# Patient Record
Sex: FEMALE | Race: WHITE | NOT HISPANIC OR LATINO | URBAN - METROPOLITAN AREA
[De-identification: names, ages, dates, MRNs, and addresses within clinical notes are randomized per-mention and may not be internally consistent; named-entity substitution may affect disease eponyms.]

---

## 2023-01-26 ENCOUNTER — INPATIENT (INPATIENT)
Facility: HOSPITAL | Age: 58
LOS: 3 days | Discharge: HOME | DRG: 872 | End: 2023-01-30
Attending: INTERNAL MEDICINE | Admitting: INTERNAL MEDICINE
Payer: COMMERCIAL

## 2023-01-26 VITALS
DIASTOLIC BLOOD PRESSURE: 69 MMHG | SYSTOLIC BLOOD PRESSURE: 131 MMHG | TEMPERATURE: 101 F | WEIGHT: 119.93 LBS | OXYGEN SATURATION: 100 % | HEART RATE: 124 BPM | HEIGHT: 61 IN | RESPIRATION RATE: 16 BRPM

## 2023-01-26 DIAGNOSIS — E78.5 HYPERLIPIDEMIA, UNSPECIFIED: ICD-10-CM

## 2023-01-26 DIAGNOSIS — N17.9 ACUTE KIDNEY FAILURE, UNSPECIFIED: ICD-10-CM

## 2023-01-26 DIAGNOSIS — N12 TUBULO-INTERSTITIAL NEPHRITIS, NOT SPECIFIED AS ACUTE OR CHRONIC: ICD-10-CM

## 2023-01-26 DIAGNOSIS — I10 ESSENTIAL (PRIMARY) HYPERTENSION: ICD-10-CM

## 2023-01-26 LAB
ALBUMIN SERPL ELPH-MCNC: 3.1 G/DL — LOW (ref 3.3–5)
ALP SERPL-CCNC: 131 U/L — HIGH (ref 30–120)
ALT FLD-CCNC: 29 U/L DA — SIGNIFICANT CHANGE UP (ref 10–60)
ANION GAP SERPL CALC-SCNC: 9 MMOL/L — SIGNIFICANT CHANGE UP (ref 5–17)
APPEARANCE UR: CLEAR — SIGNIFICANT CHANGE UP
APTT BLD: 26.4 SEC — LOW (ref 27.5–35.5)
AST SERPL-CCNC: 28 U/L — SIGNIFICANT CHANGE UP (ref 10–40)
BACTERIA # UR AUTO: ABNORMAL
BASOPHILS # BLD AUTO: 0.02 K/UL — SIGNIFICANT CHANGE UP (ref 0–0.2)
BASOPHILS NFR BLD AUTO: 0.2 % — SIGNIFICANT CHANGE UP (ref 0–2)
BILIRUB SERPL-MCNC: 0.6 MG/DL — SIGNIFICANT CHANGE UP (ref 0.2–1.2)
BILIRUB UR-MCNC: NEGATIVE — SIGNIFICANT CHANGE UP
BUN SERPL-MCNC: 42 MG/DL — HIGH (ref 7–23)
CALCIUM SERPL-MCNC: 10.2 MG/DL — SIGNIFICANT CHANGE UP (ref 8.4–10.5)
CHLORIDE SERPL-SCNC: 99 MMOL/L — SIGNIFICANT CHANGE UP (ref 96–108)
CO2 SERPL-SCNC: 26 MMOL/L — SIGNIFICANT CHANGE UP (ref 22–31)
COLOR SPEC: YELLOW — SIGNIFICANT CHANGE UP
CREAT SERPL-MCNC: 2.7 MG/DL — HIGH (ref 0.5–1.3)
DIFF PNL FLD: ABNORMAL
EGFR: 20 ML/MIN/1.73M2 — LOW
EOSINOPHIL # BLD AUTO: 0 K/UL — SIGNIFICANT CHANGE UP (ref 0–0.5)
EOSINOPHIL NFR BLD AUTO: 0 % — SIGNIFICANT CHANGE UP (ref 0–6)
EPI CELLS # UR: SIGNIFICANT CHANGE UP
FLUAV AG NPH QL: SIGNIFICANT CHANGE UP
FLUBV AG NPH QL: SIGNIFICANT CHANGE UP
GLUCOSE SERPL-MCNC: 113 MG/DL — HIGH (ref 70–99)
GLUCOSE UR QL: NEGATIVE MG/DL — SIGNIFICANT CHANGE UP
HCT VFR BLD CALC: 33.5 % — LOW (ref 34.5–45)
HGB BLD-MCNC: 10.8 G/DL — LOW (ref 11.5–15.5)
IMM GRANULOCYTES NFR BLD AUTO: 0.5 % — SIGNIFICANT CHANGE UP (ref 0–0.9)
INR BLD: 1.08 RATIO — SIGNIFICANT CHANGE UP (ref 0.88–1.16)
KETONES UR-MCNC: ABNORMAL
LACTATE SERPL-SCNC: 0.6 MMOL/L — LOW (ref 0.7–2)
LEUKOCYTE ESTERASE UR-ACNC: ABNORMAL
LIDOCAIN IGE QN: 75 U/L — SIGNIFICANT CHANGE UP (ref 73–393)
LYMPHOCYTES # BLD AUTO: 0.4 K/UL — LOW (ref 1–3.3)
LYMPHOCYTES # BLD AUTO: 4.2 % — LOW (ref 13–44)
MCHC RBC-ENTMCNC: 27.3 PG — SIGNIFICANT CHANGE UP (ref 27–34)
MCHC RBC-ENTMCNC: 32.2 GM/DL — SIGNIFICANT CHANGE UP (ref 32–36)
MCV RBC AUTO: 84.8 FL — SIGNIFICANT CHANGE UP (ref 80–100)
MONOCYTES # BLD AUTO: 0.84 K/UL — SIGNIFICANT CHANGE UP (ref 0–0.9)
MONOCYTES NFR BLD AUTO: 8.9 % — SIGNIFICANT CHANGE UP (ref 2–14)
NEUTROPHILS # BLD AUTO: 8.17 K/UL — HIGH (ref 1.8–7.4)
NEUTROPHILS NFR BLD AUTO: 86.2 % — HIGH (ref 43–77)
NITRITE UR-MCNC: NEGATIVE — SIGNIFICANT CHANGE UP
NRBC # BLD: 0 /100 WBCS — SIGNIFICANT CHANGE UP (ref 0–0)
PH UR: 6 — SIGNIFICANT CHANGE UP (ref 5–8)
PLATELET # BLD AUTO: 189 K/UL — SIGNIFICANT CHANGE UP (ref 150–400)
POTASSIUM SERPL-MCNC: 4.1 MMOL/L — SIGNIFICANT CHANGE UP (ref 3.5–5.3)
POTASSIUM SERPL-SCNC: 4.1 MMOL/L — SIGNIFICANT CHANGE UP (ref 3.5–5.3)
PROT SERPL-MCNC: 7.4 G/DL — SIGNIFICANT CHANGE UP (ref 6–8.3)
PROT UR-MCNC: 100 MG/DL
PROTHROM AB SERPL-ACNC: 12.8 SEC — SIGNIFICANT CHANGE UP (ref 10.5–13.4)
RBC # BLD: 3.95 M/UL — SIGNIFICANT CHANGE UP (ref 3.8–5.2)
RBC # FLD: 14.4 % — SIGNIFICANT CHANGE UP (ref 10.3–14.5)
RBC CASTS # UR COMP ASSIST: ABNORMAL /HPF (ref 0–4)
RSV RNA NPH QL NAA+NON-PROBE: SIGNIFICANT CHANGE UP
SARS-COV-2 RNA SPEC QL NAA+PROBE: SIGNIFICANT CHANGE UP
SODIUM SERPL-SCNC: 134 MMOL/L — LOW (ref 135–145)
SP GR SPEC: 1.01 — SIGNIFICANT CHANGE UP (ref 1.01–1.02)
UROBILINOGEN FLD QL: NEGATIVE MG/DL — SIGNIFICANT CHANGE UP
WBC # BLD: 9.48 K/UL — SIGNIFICANT CHANGE UP (ref 3.8–10.5)
WBC # FLD AUTO: 9.48 K/UL — SIGNIFICANT CHANGE UP (ref 3.8–10.5)
WBC UR QL: ABNORMAL

## 2023-01-26 PROCEDURE — 74176 CT ABD & PELVIS W/O CONTRAST: CPT | Mod: 26,MA

## 2023-01-26 PROCEDURE — 71045 X-RAY EXAM CHEST 1 VIEW: CPT | Mod: 26

## 2023-01-26 PROCEDURE — 99285 EMERGENCY DEPT VISIT HI MDM: CPT

## 2023-01-26 PROCEDURE — 93010 ELECTROCARDIOGRAM REPORT: CPT

## 2023-01-26 RX ORDER — ACETAMINOPHEN 500 MG
1000 TABLET ORAL ONCE
Refills: 0 | Status: COMPLETED | OUTPATIENT
Start: 2023-01-26 | End: 2023-01-26

## 2023-01-26 RX ORDER — ATORVASTATIN CALCIUM 80 MG/1
1 TABLET, FILM COATED ORAL
Qty: 0 | Refills: 0 | DISCHARGE

## 2023-01-26 RX ORDER — ONDANSETRON 8 MG/1
4 TABLET, FILM COATED ORAL ONCE
Refills: 0 | Status: COMPLETED | OUTPATIENT
Start: 2023-01-26 | End: 2023-01-26

## 2023-01-26 RX ORDER — SODIUM CHLORIDE 9 MG/ML
1500 INJECTION INTRAMUSCULAR; INTRAVENOUS; SUBCUTANEOUS ONCE
Refills: 0 | Status: COMPLETED | OUTPATIENT
Start: 2023-01-26 | End: 2023-01-26

## 2023-01-26 RX ORDER — AMLODIPINE BESYLATE 2.5 MG/1
1 TABLET ORAL
Qty: 0 | Refills: 0 | DISCHARGE

## 2023-01-26 RX ORDER — AMLODIPINE BESYLATE 2.5 MG/1
5 TABLET ORAL DAILY
Refills: 0 | Status: DISCONTINUED | OUTPATIENT
Start: 2023-01-26 | End: 2023-01-27

## 2023-01-26 RX ORDER — FAMOTIDINE 10 MG/ML
20 INJECTION INTRAVENOUS ONCE
Refills: 0 | Status: COMPLETED | OUTPATIENT
Start: 2023-01-26 | End: 2023-01-26

## 2023-01-26 RX ORDER — ENOXAPARIN SODIUM 100 MG/ML
30 INJECTION SUBCUTANEOUS EVERY 24 HOURS
Refills: 0 | Status: DISCONTINUED | OUTPATIENT
Start: 2023-01-26 | End: 2023-01-30

## 2023-01-26 RX ORDER — CEFTRIAXONE 500 MG/1
1000 INJECTION, POWDER, FOR SOLUTION INTRAMUSCULAR; INTRAVENOUS ONCE
Refills: 0 | Status: COMPLETED | OUTPATIENT
Start: 2023-01-26 | End: 2023-01-26

## 2023-01-26 RX ORDER — ONDANSETRON 8 MG/1
4 TABLET, FILM COATED ORAL ONCE
Refills: 0 | Status: DISCONTINUED | OUTPATIENT
Start: 2023-01-26 | End: 2023-01-30

## 2023-01-26 RX ORDER — ACETAMINOPHEN 500 MG
650 TABLET ORAL EVERY 6 HOURS
Refills: 0 | Status: DISCONTINUED | OUTPATIENT
Start: 2023-01-26 | End: 2023-01-30

## 2023-01-26 RX ORDER — CEFTRIAXONE 500 MG/1
1000 INJECTION, POWDER, FOR SOLUTION INTRAMUSCULAR; INTRAVENOUS EVERY 24 HOURS
Refills: 0 | Status: COMPLETED | OUTPATIENT
Start: 2023-01-26 | End: 2023-01-27

## 2023-01-26 RX ORDER — SODIUM CHLORIDE 9 MG/ML
1000 INJECTION INTRAMUSCULAR; INTRAVENOUS; SUBCUTANEOUS
Refills: 0 | Status: DISCONTINUED | OUTPATIENT
Start: 2023-01-26 | End: 2023-01-28

## 2023-01-26 RX ORDER — ATORVASTATIN CALCIUM 80 MG/1
10 TABLET, FILM COATED ORAL AT BEDTIME
Refills: 0 | Status: DISCONTINUED | OUTPATIENT
Start: 2023-01-26 | End: 2023-01-30

## 2023-01-26 RX ORDER — ALBUTEROL 90 UG/1
1 AEROSOL, METERED ORAL EVERY 6 HOURS
Refills: 0 | Status: DISCONTINUED | OUTPATIENT
Start: 2023-01-26 | End: 2023-01-30

## 2023-01-26 RX ORDER — LACTOBACILLUS ACIDOPHILUS 100MM CELL
1 CAPSULE ORAL
Refills: 0 | Status: DISCONTINUED | OUTPATIENT
Start: 2023-01-26 | End: 2023-01-30

## 2023-01-26 RX ORDER — ALBUTEROL 90 UG/1
0 AEROSOL, METERED ORAL
Qty: 0 | Refills: 0 | DISCHARGE

## 2023-01-26 RX ADMIN — Medication 400 MILLIGRAM(S): at 18:27

## 2023-01-26 RX ADMIN — ENOXAPARIN SODIUM 30 MILLIGRAM(S): 100 INJECTION SUBCUTANEOUS at 22:29

## 2023-01-26 RX ADMIN — ONDANSETRON 4 MILLIGRAM(S): 8 TABLET, FILM COATED ORAL at 18:19

## 2023-01-26 RX ADMIN — CEFTRIAXONE 100 MILLIGRAM(S): 500 INJECTION, POWDER, FOR SOLUTION INTRAMUSCULAR; INTRAVENOUS at 19:52

## 2023-01-26 RX ADMIN — SODIUM CHLORIDE 100 MILLILITER(S): 9 INJECTION INTRAMUSCULAR; INTRAVENOUS; SUBCUTANEOUS at 22:29

## 2023-01-26 RX ADMIN — Medication 1000 MILLIGRAM(S): at 19:19

## 2023-01-26 RX ADMIN — SODIUM CHLORIDE 1500 MILLILITER(S): 9 INJECTION INTRAMUSCULAR; INTRAVENOUS; SUBCUTANEOUS at 18:21

## 2023-01-26 RX ADMIN — ATORVASTATIN CALCIUM 10 MILLIGRAM(S): 80 TABLET, FILM COATED ORAL at 22:29

## 2023-01-26 RX ADMIN — CEFTRIAXONE 1000 MILLIGRAM(S): 500 INJECTION, POWDER, FOR SOLUTION INTRAMUSCULAR; INTRAVENOUS at 20:22

## 2023-01-26 RX ADMIN — SODIUM CHLORIDE 1500 MILLILITER(S): 9 INJECTION INTRAMUSCULAR; INTRAVENOUS; SUBCUTANEOUS at 19:55

## 2023-01-26 RX ADMIN — FAMOTIDINE 20 MILLIGRAM(S): 10 INJECTION INTRAVENOUS at 18:21

## 2023-01-26 RX ADMIN — AMLODIPINE BESYLATE 5 MILLIGRAM(S): 2.5 TABLET ORAL at 22:30

## 2023-01-26 NOTE — ED PROVIDER NOTE - NS ED ATTENDING STATEMENT MOD
This was a shared visit with the AMAIRANI. I reviewed and verified the documentation and independently performed the documented:

## 2023-01-26 NOTE — ED ADULT NURSE NOTE - OBJECTIVE STATEMENT
I was diagnosed with UTI Saturday at Urgent Care, started Bactrim that day, but I started to have nausea, vomiting, headache, abdominal pain Monday, I went there again and they changed my medicine to Nitrofuradantoin  but I am still vomiting, I still have a headache, nausea

## 2023-01-26 NOTE — H&P ADULT - ASSESSMENT
57-year-old female past medical history of hypertension hyperlipidemia asthma C-sections x2 coming in for fever and UTI symptoms for over 1 week.  Patient states she went to urgent care diagnosed with UTI started on Bactrim developed abdominal pain vomiting so went back yesterday thought to be antibiotic reaction started on Macrobid but continues to have vomiting.  Patient also having low-grade temperatures flu COVID-negative urinary symptoms have improved.  Patient denies any chest pain shortness of breath cough.   In ED febrile, tachycardic, neutrophilia, elvated BUN creatinine, and CT - rt renal edema pyelonephritis.started on ceftriaxone and admitted for  sepsis ( fever tachycardia, neutrophilia,raj)  due to ac pyelonephritis  ess HTN  HLD  RAJ   57-year-old female past medical history of hypertension hyperlipidemia asthma C-sections x2 coming in for fever and UTI symptoms for over 1 week.  Patient states she went to urgent care diagnosed with UTI started on Bactrim developed abdominal pain vomiting so went back yesterday thought to be antibiotic reaction started on Macrobid but continues to have vomiting.  Patient also having low-grade temperatures flu COVID-negative urinary symptoms have improved.  Patient denies any chest pain shortness of breath cough.   In ED febrile, tachycardic, neutrophilia, elvated BUN creatinine, and CT - rt renal edema pyelonephritis.started on ceftriaxone and admitted for  sepsis ( fever tachycardia, neutrophilia,raj)  due to ac pyelonephritis  ess HTN  HLD  RAJ   started on ceftriaxone, ID consult and nephro consult called

## 2023-01-26 NOTE — H&P ADULT - HISTORY OF PRESENT ILLNESS
57-year-old female past medical history of hypertension hyperlipidemia asthma C-sections x2 coming in for fever and UTI symptoms for over 1 week.  Patient states she went to urgent care diagnosed with UTI started on Bactrim developed abdominal pain vomiting so went back yesterday thought to be antibiotic reaction started on Macrobid but continues to have vomiting.  Patient also having low-grade temperatures flu COVID-negative urinary symptoms have improved.  Patient denies any chest pain shortness of breath cough.   In ED febrile, tachycardic, neutrophilia, elvated BUN creatinine, and CT - rt renal edema pyelonephritis.started on ceftriaxone and admitted

## 2023-01-26 NOTE — ED ADULT NURSE NOTE - DOES PATIENT HAVE ADVANCE DIRECTIVE
----- Message from Stewrat Richards sent at 6/21/2018  2:12 PM CDT -----  Contact: MILDRED BANGURA [[id number]]            Name of Who is Calling: MILDRED BANGURA [[id number]]  What is the request in detail: Patient would like to speak with staff in regards to follow up on next step; especially while pregnant.      Can the clinic reply by [facility]: yes      What Number to Call Back if not in [facility]: [phone]                                  
Patients wants to know what is the next steps in migraine prevention since she is pregnant. Will forward message to provider for further instructions.  
No

## 2023-01-26 NOTE — H&P ADULT - TIME BILLING
I have discussed care plan with patient and HCP,expressed understanding of problems treatment and their effect and side effects, alternatives in detail,I have asked if they have any questions and concerns and appropriately addressed them to best of my ability  Reviewed all diagonostic tests, lab results and drug drug interactions, and medications  d/w ID , nephro consultants

## 2023-01-26 NOTE — ED ADULT TRIAGE NOTE - CHIEF COMPLAINT QUOTE
" I was diagnosed with UTI Saturday at Urgent Care, started Bactrim that day, but I started to have nausea, vomiting, headache, abdominal pain Monday, I went there again and they changed my medicine to Nitrofuradantoin  but I am still vomiting, I still have a headache, nausea

## 2023-01-26 NOTE — ED PROVIDER NOTE - DIFFERENTIAL DIAGNOSIS
Differential including but not limited to UTI kidney stone pancreatitis diverticulitis colitis or other intra-abdominal infection pneumonia flu COVID electrolyte abnormality Differential Diagnosis

## 2023-01-26 NOTE — ED PROVIDER NOTE - CLINICAL SUMMARY MEDICAL DECISION MAKING FREE TEXT BOX
Patient complaining of dysuria fevers for approximately 1 week.  Patient was seen in urgent care diagnosed with UTI started on Bactrim however few days later she developed abdominal pain nausea vomiting.  Patient denies chest pain short of breath cough diarrhea    Plan EKG chest x-ray CT labs IV fluids Zofran Pepcid Ofirmev  Differential including but not limited to UTI kidney stone pancreatitis diverticulitis colitis or other intra-abdominal infection pneumonia flu COVID electrolyte abnormality

## 2023-01-26 NOTE — ED PROVIDER NOTE - OBJECTIVE STATEMENT
Patient is a 57-year-old female past medical history of hypertension hyperlipidemia asthma C-sections x2 coming in for fever and UTI symptoms for over 1 week.  Patient states she went to urgent care diagnosed with UTI started on Bactrim developed abdominal pain vomiting so went back yesterday thought to be antibiotic reaction started on Macrobid but continues to have vomiting.  Patient also having low-grade temperatures flu COVID-negative urinary symptoms have improved.  Patient denies any chest pain shortness of breath cough.

## 2023-01-27 LAB
A1C WITH ESTIMATED AVERAGE GLUCOSE RESULT: 5.6 % — SIGNIFICANT CHANGE UP (ref 4–5.6)
ALBUMIN SERPL ELPH-MCNC: 2.3 G/DL — LOW (ref 3.3–5)
ALP SERPL-CCNC: 106 U/L — SIGNIFICANT CHANGE UP (ref 30–120)
ALT FLD-CCNC: 26 U/L DA — SIGNIFICANT CHANGE UP (ref 10–60)
ANION GAP SERPL CALC-SCNC: 9 MMOL/L — SIGNIFICANT CHANGE UP (ref 5–17)
AST SERPL-CCNC: 27 U/L — SIGNIFICANT CHANGE UP (ref 10–40)
BASOPHILS # BLD AUTO: 0.01 K/UL — SIGNIFICANT CHANGE UP (ref 0–0.2)
BASOPHILS NFR BLD AUTO: 0.2 % — SIGNIFICANT CHANGE UP (ref 0–2)
BILIRUB SERPL-MCNC: 0.4 MG/DL — SIGNIFICANT CHANGE UP (ref 0.2–1.2)
BUN SERPL-MCNC: 34 MG/DL — HIGH (ref 7–23)
CALCIUM SERPL-MCNC: 8.8 MG/DL — SIGNIFICANT CHANGE UP (ref 8.4–10.5)
CHLORIDE SERPL-SCNC: 106 MMOL/L — SIGNIFICANT CHANGE UP (ref 96–108)
CHOLEST SERPL-MCNC: 114 MG/DL — SIGNIFICANT CHANGE UP
CO2 SERPL-SCNC: 24 MMOL/L — SIGNIFICANT CHANGE UP (ref 22–31)
CREAT SERPL-MCNC: 2.02 MG/DL — HIGH (ref 0.5–1.3)
EGFR: 28 ML/MIN/1.73M2 — LOW
EOSINOPHIL # BLD AUTO: 0.02 K/UL — SIGNIFICANT CHANGE UP (ref 0–0.5)
EOSINOPHIL NFR BLD AUTO: 0.3 % — SIGNIFICANT CHANGE UP (ref 0–6)
ESTIMATED AVERAGE GLUCOSE: 114 MG/DL — SIGNIFICANT CHANGE UP (ref 68–114)
GLUCOSE SERPL-MCNC: 96 MG/DL — SIGNIFICANT CHANGE UP (ref 70–99)
HCT VFR BLD CALC: 29.8 % — LOW (ref 34.5–45)
HCV AB S/CO SERPL IA: 0.05 S/CO — SIGNIFICANT CHANGE UP (ref 0–0.99)
HCV AB SERPL-IMP: SIGNIFICANT CHANGE UP
HDLC SERPL-MCNC: 22 MG/DL — LOW
HGB BLD-MCNC: 9.3 G/DL — LOW (ref 11.5–15.5)
IMM GRANULOCYTES NFR BLD AUTO: 0.3 % — SIGNIFICANT CHANGE UP (ref 0–0.9)
LIPID PNL WITH DIRECT LDL SERPL: 59 MG/DL — SIGNIFICANT CHANGE UP
LYMPHOCYTES # BLD AUTO: 0.54 K/UL — LOW (ref 1–3.3)
LYMPHOCYTES # BLD AUTO: 8.9 % — LOW (ref 13–44)
MCHC RBC-ENTMCNC: 27.1 PG — SIGNIFICANT CHANGE UP (ref 27–34)
MCHC RBC-ENTMCNC: 31.2 GM/DL — LOW (ref 32–36)
MCV RBC AUTO: 86.9 FL — SIGNIFICANT CHANGE UP (ref 80–100)
MONOCYTES # BLD AUTO: 0.59 K/UL — SIGNIFICANT CHANGE UP (ref 0–0.9)
MONOCYTES NFR BLD AUTO: 9.8 % — SIGNIFICANT CHANGE UP (ref 2–14)
NEUTROPHILS # BLD AUTO: 4.86 K/UL — SIGNIFICANT CHANGE UP (ref 1.8–7.4)
NEUTROPHILS NFR BLD AUTO: 80.5 % — HIGH (ref 43–77)
NON HDL CHOLESTEROL: 91 MG/DL — SIGNIFICANT CHANGE UP
NRBC # BLD: 0 /100 WBCS — SIGNIFICANT CHANGE UP (ref 0–0)
PLATELET # BLD AUTO: 128 K/UL — LOW (ref 150–400)
POTASSIUM SERPL-MCNC: 4.4 MMOL/L — SIGNIFICANT CHANGE UP (ref 3.5–5.3)
POTASSIUM SERPL-SCNC: 4.4 MMOL/L — SIGNIFICANT CHANGE UP (ref 3.5–5.3)
PROT SERPL-MCNC: 5.4 G/DL — LOW (ref 6–8.3)
RBC # BLD: 3.43 M/UL — LOW (ref 3.8–5.2)
RBC # FLD: 14.7 % — HIGH (ref 10.3–14.5)
SODIUM SERPL-SCNC: 139 MMOL/L — SIGNIFICANT CHANGE UP (ref 135–145)
TRIGL SERPL-MCNC: 163 MG/DL — HIGH
WBC # BLD: 6.04 K/UL — SIGNIFICANT CHANGE UP (ref 3.8–10.5)
WBC # FLD AUTO: 6.04 K/UL — SIGNIFICANT CHANGE UP (ref 3.8–10.5)

## 2023-01-27 RX ORDER — CEFTRIAXONE 500 MG/1
2000 INJECTION, POWDER, FOR SOLUTION INTRAMUSCULAR; INTRAVENOUS EVERY 24 HOURS
Refills: 0 | Status: COMPLETED | OUTPATIENT
Start: 2023-01-28 | End: 2023-01-30

## 2023-01-27 RX ADMIN — Medication 650 MILLIGRAM(S): at 01:39

## 2023-01-27 RX ADMIN — Medication 650 MILLIGRAM(S): at 09:34

## 2023-01-27 RX ADMIN — Medication 650 MILLIGRAM(S): at 10:34

## 2023-01-27 RX ADMIN — ATORVASTATIN CALCIUM 10 MILLIGRAM(S): 80 TABLET, FILM COATED ORAL at 21:26

## 2023-01-27 RX ADMIN — Medication 1 TABLET(S): at 17:13

## 2023-01-27 RX ADMIN — Medication 650 MILLIGRAM(S): at 03:19

## 2023-01-27 RX ADMIN — Medication 1 TABLET(S): at 09:34

## 2023-01-27 RX ADMIN — Medication 650 MILLIGRAM(S): at 17:14

## 2023-01-27 RX ADMIN — Medication 650 MILLIGRAM(S): at 18:10

## 2023-01-27 RX ADMIN — ENOXAPARIN SODIUM 30 MILLIGRAM(S): 100 INJECTION SUBCUTANEOUS at 21:26

## 2023-01-27 RX ADMIN — CEFTRIAXONE 100 MILLIGRAM(S): 500 INJECTION, POWDER, FOR SOLUTION INTRAMUSCULAR; INTRAVENOUS at 13:23

## 2023-01-27 NOTE — CONSULT NOTE ADULT - ASSESSMENT
The patient is a 57 year old female with a history of HTN, HL, asthma who presents with fevers, nausea, vomiting in the setting of pyelonephritis.    Plan:  - ECG with sinus tachycardia; no evidence of ischemia or infarction  - Initial tachycardia likely due to underlying infection  - BP on lower side - systolics 90-100s  - Hold amlodipine  - As BP improves, will resume amlodipine  - Continue atorvastatin 10 mg daily  - IV antibiotics  - Continue IV fluids

## 2023-01-27 NOTE — CONSULT NOTE ADULT - SUBJECTIVE AND OBJECTIVE BOX
HPI:  56YO F PMH hypertension hyperlipidemia asthma C-sections x 2 who presented to the hospital with fever myalgias Right flank pain, dysuria and urinary frequency- Was seen out pt last week and was prescribed Bactrim then changed to Macrobid.  In ED febrile Tmax 100.8, tachycardic, RAJ and CT - rt renal edema pyelonephritis. UA with pyuria.     Infectious Disease consult was called to evaluate pt and for antibiotic management.      Past Medical & Surgical Hx:  PAST MEDICAL & SURGICAL HISTORY:      Social History--  EtOH: denies   Tobacco: denies   Drug Use: denies     FAMILY HISTORY:  Noncontributory    Allergies  No Known Allergies    Intolerances  NONE      Home Medications:  albuterol 90 mcg/inh inhalation aerosol with adapter:  (2023 20:04)  amLODIPine 5 mg oral tablet: 1 tab(s) orally once a day (2023 20:04)  atorvastatin 10 mg oral tablet: 1 tab(s) orally once a day (2023 20:04)      Current Inpatient Medications :    ANTIBIOTICS:   cefTRIAXone   IVPB 1000 milliGRAM(s) IV Intermittent every 24 hours      OTHER RELEVANT MEDICATIONS :  acetaminophen     Tablet .. 650 milliGRAM(s) Oral every 6 hours PRN  albuterol    90 MICROgram(s) HFA Inhaler 1 Puff(s) Inhalation every 6 hours PRN  atorvastatin 10 milliGRAM(s) Oral at bedtime  enoxaparin Injectable 30 milliGRAM(s) SubCutaneous every 24 hours  ondansetron Injectable 4 milliGRAM(s) IV Push once  sodium chloride 0.9%. 1000 milliLiter(s) IV Continuous <Continuous>      ROS:  CONSTITUTIONAL: + fever or chills,  EYES:  Negative  blurry vision or double vision  CARDIOVASCULAR:  Negative for chest pain or palpitations  RESPIRATORY:  Negative for cough, wheezing, or SOB   GASTROINTESTINAL:  Negative for nausea, vomiting, diarrhea, constipation, + abdominal pain  GENITOURINARY:  Negative frequency, urgency , dysuria or hematuria   NEUROLOGIC:  No headache, confusion, dizziness, lightheadedness  All other systems were reviewed and are negative      I&O's Detail    2023 07:01  -  2023 07:00  --------------------------------------------------------  IN:    sodium chloride 0.9%: 400 mL  Total IN: 400 mL    OUT:  Total OUT: 0 mL    Total NET: 400 mL      Physical Exam:  Vital Signs Last 24 Hrs  T(C): 37.1 (2023 11:16), Max: 38.2 (2023 17:27)  T(F): 98.7 (2023 11:16), Max: 100.8 (2023 17:27)  HR: 94 (2023 11:16) (80 - 124)  BP: 101/62 (2023 11:16) (96/61 - 131/69)  RR: 17 (2023 11:16) (14 - 17)  SpO2: 92% (2023 11:16) (92% - 100%)    Parameters below as of 2023 05:20  Patient On (Oxygen Delivery Method): room air      Height (cm): 154.9 ( @ 17:27)  Weight (kg): 54.4 ( @ 17:27)  BMI (kg/m2): 22.7 ( @ 17:27)  BSA (m2): 1.52 ( @ 17:27)    General:  no acute distress  Neck: supple, trachea midline  Lungs: clear, no wheeze/rhonchi  Cardiovascular: regular rate and rhythm, S1 S2  Abdomen: soft, nontender, ND, bowel sounds normal  Right flank pain  Neurological:  alert and oriented x3  Skin: no rash  Extremities: no edema    Labs:                        9.3    6.04  )-----------( 128      ( 2023 08:11 )             29.8       139  |  106  |  34<H>  ----------------------------<  96  4.4   |  24  |  2.02<H>    Ca    8.8      2023 08:11    TPro  5.4<L>  /  Alb  2.3<L>  /  TBili  0.4  /  DBili  x   /  AST    /  ALT  26  /  AlkPhos  106         Urinalysis Basic - ( 2023 18:05 )    Color: Yellow / Appearance: Clear / S.010 / pH: x  Gluc: x / Ketone: Moderate  / Bili: Negative / Urobili: Negative mg/dL   Blood: x / Protein: 100 mg/dL / Nitrite: Negative   Leuk Esterase: Moderate / RBC: 6-10 /HPF / WBC 26-50   Sq Epi: x / Non Sq Epi: Few / Bacteria: Few      RECENT CULTURES:          RADIOLOGY & ADDITIONAL STUDIES:    ACC: [id number] EXAM:  CT ABDOMEN AND PELVIS   ORDERED BY: TAMIA HERRERA     PROCEDURE DATE:  2023          INTERPRETATION:  CLINICAL INFORMATION: 57-year-old female patient with   fever and abdominal pain, vomiting    COMPARISON: CT abdomen pelvis 2010.    CONTRAST/COMPLICATIONS:  IV Contrast: NONE  Oral Contrast: NONE  Complications: None reported at time of study completion    PROCEDURE:  CT of the Abdomen and Pelvis was performed.  Sagittal and coronal reformats were performed.    FINDINGS:  LOWER CHEST: Within normal limits.    LIVER: Within normal limits.  BILE DUCTS: Normal caliber.  GALLBLADDER: Decompressed gallbladder with sludge.  SPLEEN: Within normal limits of size.  PANCREAS: Within normal limits.  ADRENALS: Within normal limits.  KIDNEYS/URETERS: Asymmetric enlargement of the right kidney with edema,   extensive right perinephric stranding and periureteral stranding   surrounding the proximal ureter, however without saleem hydronephrosis or   hydroureter. No nephroureterolithiasis. No calcified renal or ureteral   calculi. Left kidney and left collecting system appear normal.    BLADDER: Within normal limits.Several phleboliths in the pelvis unrelated   to the urinary system.  REPRODUCTIVE ORGANS: Uterus and adnexa within normal limits.    BOWEL: Small hiatal hernia. Stomach, duodenum and small bowel appear   grossly normal. There is no bowel obstruction. Appendix is normal.  PERITONEUM: Trace free pelvic fluid. No loculated collections. Extensive   edema surrounding the right kidney is detailed above.  VESSELS: Within normal limits.  RETROPERITONEUM/LYMPH NODES: No lymphadenopathy.  ABDOMINAL WALL: Within normal limits.  BONES: Within normal limits.    IMPRESSION:  Asymmetric edema and swelling of the right kidney and surrounding tissues   compared to left without saleem hydroureteronephrosis or obstructing   calcified urolithiasis. Findings are concerning for urinary tract   infection with right pyelonephritis not excluded. If there is clinical   concern, this could be better evaluated with CT of the abdomen and pelvis   with iv contrast.    Assessment :   56YO F PMH hypertension hyperlipidemia asthma C-sections x 2admitted with sepsis sec Acute right pyelonephritis.   In ED febrile Tmax 100.8, tachycardic, RAJ and CT - rt renal edema pyelonephritis. UA with pyuria.   RAJ poss sec Bactrim  No rash      Plan :   Cont Rocephin, increase to 2grams IV daily   Trend temps and cbc, renal fx  Fu cultures  Pulm toileting  Increase activity    Continue with present regiment .  Approptiate use of antibiotics and adverse effects reviewed.      I have discussed the above plan of care with patient in detail. She expressed understanding of the treatment plan . Risks, benefits and alternatives discussed in detail. I have asked if she has any questions or concerns and appropriately addressed them to the best of my ability .      > 45 minutes spent in direct patient care reviewing  the notes, lab data/ imaging , discussion with multidisciplinary team. All questions were addressed and answered to the best of my capacity .    Thank you for allowing me to participate in the care of your patient .      Elen Banda MD  Infectious Disease  [phone]
HPI:  Patient is a 57y old  Female admitted for management of UTI after failed a 4 day outpt course of Bactrim. C/o nausea, vomiting chills.   Cr 2.70 on admission. Improving in response to saline. No recent NSAIDs, angiographic dye.         PAST MEDICAL & SURGICAL HISTORY:  Hyperlipidemia      Social Hx: not a smoker    FAMILY HISTORY: no hx of renal dz      Allergies    No Known Allergies          MEDICATIONS  (STANDING):  atorvastatin 10 milliGRAM(s) Oral at bedtime  cefTRIAXone   IVPB 1000 milliGRAM(s) IV Intermittent every 24 hours  enoxaparin Injectable 30 milliGRAM(s) SubCutaneous every 24 hours  lactobacillus acidophilus 1 Tablet(s) Oral two times a day with meals  ondansetron Injectable 4 milliGRAM(s) IV Push once  sodium chloride 0.9%. 1000 milliLiter(s) (100 mL/Hr) IV Continuous <Continuous>    MEDICATIONS  (PRN):  acetaminophen     Tablet .. 650 milliGRAM(s) Oral every 6 hours PRN Temp greater or equal to 38C (100.4F), Moderate Pain (4 - 6)  albuterol    90 MICROgram(s) HFA Inhaler 1 Puff(s) Inhalation every 6 hours PRN Bronchospasm      Daily Height in cm: 154.94 (2023 17:27)    Daily     Drug Dosing Weight  Height (cm): 154.9 (2023 17:27)  Weight (kg): 54.4 (2023 17:27)  BMI (kg/m2): 22.7 (2023 17:27)  BSA (m2): 1.52 (2023 17:27)      REVIEW OF SYSTEMS:      Chills  Back pain  Frequency.   RAJ            I&O's Detail    2023 07:01  -  2023 07:00  --------------------------------------------------------  IN:    sodium chloride 0.9%: 400 mL  Total IN: 400 mL    OUT:  Total OUT: 0 mL    Total NET: 400 mL           @ 07:  -   @ 07:00  --------------------------------------------------------  IN: 400 mL / OUT: 0 mL / NET: 400 mL        PHYSICAL EXAM:    GENERAL: anxious, tremulous.   HEAD:  Atraumatic, normocephalic  EYES: EOMI, PERRLA. Conjunctiva and sclera clear  ENMT: moist mucous membranes.   NECK: Supple. No increase in JVP  NERVOUS SYSTEM:  Alert & Oriented X3. Motor Strength 5/5 B/L upper and lower extremities; DTRs 2+ intact and symmetric  CHEST/LUNG: Clear to auscultation bilaterally  HEART: Regular rate and rhythm. No murmurs, rubs, or gallops  ABDOMEN: Soft, SP tenderness. No r/g  EXTREMITIES:  no edema      LABS:  CBC Full  -  ( 2023 08:11 )  WBC Count : 6.04 K/uL  RBC Count : 3.43 M/uL  Hemoglobin : 9.3 g/dL  Hematocrit : 29.8 %  Platelet Count - Automated : 128 K/uL  Mean Cell Volume : 86.9 fl  Mean Cell Hemoglobin : 27.1 pg  Mean Cell Hemoglobin Concentration : 31.2 gm/dL  Auto Neutrophil # : 4.86 K/uL  Auto Lymphocyte # : 0.54 K/uL  Auto Monocyte # : 0.59 K/uL  Auto Eosinophil # : 0.02 K/uL  Auto Basophil # : 0.01 K/uL  Auto Neutrophil % : 80.5 %  Auto Lymphocyte % : 8.9 %  Auto Monocyte % : 9.8 %  Auto Eosinophil % : 0.3 %  Auto Basophil % : 0.2 %        139  |  106  |  34<H>  ----------------------------<  96  4.4   |  24  |  2.02<H>    Ca    8.8      2023 08:11    TPro  5.4<L>  /  Alb  2.3<L>  /  TBili  0.4  /  DBili  x   /  AST  27  /  ALT    /  AlkPhos  106      CAPILLARY BLOOD GLUCOSE        PT/INR - ( 2023 18:04 )   PT: 12.8 sec;   INR: 1.08 ratio         PTT - ( 2023 18:04 )  PTT:26.4 sec  Urinalysis Basic - ( 2023 18:05 )    Color: Yellow / Appearance: Clear / S.010 / pH: x  Gluc: x / Ketone: Moderate  / Bili: Negative / Urobili: Negative mg/dL   Blood: x / Protein: 100 mg/dL / Nitrite: Negative   Leuk Esterase: Moderate / RBC: 6-10 /HPF / WBC 26-50   Sq Epi: x / Non Sq Epi: Few / Bacteria: Few        Impression:  * RAJ -- pre-renal, Bactrim effect on distal Cr secretion. Improving  * UTI, pyelonephritis.     Recommendations:   * Cont saline at current rate  * BMP in am.   
History of Present Illness: The patient is a 57 year old female with a history of HTN, HL, asthma who presents with fevers, nausea, vomiting. She states she was diagnosed with a UTI one week ago and given bactrim. She later developed abdominal pain, nausea, vomiting. Symptoms worsened so she came to ED. She felt palpitations and heart racing at times.    Past Medical/Surgical History:  HTN, HL, asthma    Medications:  Home Medications:  albuterol 90 mcg/inh inhalation aerosol with adapter:  (26 Jan 2023 20:04)  amLODIPine 5 mg oral tablet: 1 tab(s) orally once a day (26 Jan 2023 20:04)  atorvastatin 10 mg oral tablet: 1 tab(s) orally once a day (26 Jan 2023 20:04)      Family History: Non-contributory family history of premature cardiovascular atherosclerotic disease    Social History: No tobacco, alcohol or drug use    Review of Systems:  General: +fevers, chills, weight gain  Skin: No rashes, color changes  Cardiovascular: No chest pain, orthopnea  Respiratory: No shortness of breath, cough  Gastrointestinal: +nausea, +abdominal pain  Genitourinary: No incontinence, pain with urination  Musculoskeletal: No pain, swelling, decreased range of motion  Neurological: No headache, weakness  Psychiatric: No depression, anxiety  Endocrine: No weight gain, increased thirst  All other systems are comprehensively negative.    Physical Exam:  Vitals:        Vital Signs Last 24 Hrs  T(C): 37 (27 Jan 2023 05:20), Max: 38.2 (26 Jan 2023 17:27)  T(F): 98.6 (27 Jan 2023 05:20), Max: 100.8 (26 Jan 2023 17:27)  HR: 80 (27 Jan 2023 05:20) (80 - 124)  BP: 96/61 (27 Jan 2023 05:20) (96/61 - 131/69)  BP(mean): --  RR: 16 (27 Jan 2023 05:20) (14 - 16)  SpO2: 97% (27 Jan 2023 05:20) (97% - 100%)  Parameters below as of 27 Jan 2023 05:20  Patient On (Oxygen Delivery Method): room air  General: NAD  HEENT: MMM  Neck: No JVD, no carotid bruit  Lungs: CTAB  CV: RRR, nl S1/S2, no M/R/G  Abdomen: S/NT/ND, +BS  Extremities: No LE edema, no cyanosis  Neuro: AAOx3, non-focal  Skin: No rash    Labs:                        9.3    6.04  )-----------( 128      ( 27 Jan 2023 08:11 )             29.8     01-27    139  |  106  |  34<H>  ----------------------------<  96  4.4   |  24  |  2.02<H>    Ca    8.8      27 Jan 2023 08:11    TPro  5.4<L>  /  Alb  2.3<L>  /  TBili  0.4  /  DBili  x   /  AST  27  /  ALT  26  /  AlkPhos  106  01-27        PT/INR - ( 26 Jan 2023 18:04 )   PT: 12.8 sec;   INR: 1.08 ratio         PTT - ( 26 Jan 2023 18:04 )  PTT:26.4 sec    ECG/Telemetry: Sinus tachycardia, normal axis, no ST abnormality

## 2023-01-27 NOTE — CARE COORDINATION ASSESSMENT. - NSCAREPROVIDERS_GEN_ALL_CORE_FT
CARE PROVIDERS:  Accepting Physician: Mildred Harley  Administration: Kimberley Caceres  Admitting: Mildred Harley  Attending: Mildred Harley  Case Management: Caron Dye  Consultant: Elen Banda  Consultant: Moses Mayo  Consultant: Emerson Wyatt ED ACP: Adin Parry  ED Attending: Bossman Hebert ED Nurse: Braulio Monroy  Infection Control: Saray Ryder  Nurse: Braulio Monroy  Nurse: Radha Maldonado  Nurse: Lorri Abarca  Nurse: Pippa Jade  Outpatient Provider: Moses Mayo  Override: Pippa Jade  PCA/Nursing Assistant: Armando Weller  PCA/Nursing Assistant: Yasmine Mariano  Primary Team: Adin Parry  Primary Team: Mildred Harley  Registered Dietitian: Lovely Schmidt  : Luisa Silva

## 2023-01-27 NOTE — CARE COORDINATION ASSESSMENT. - NSDCPLANSERVICES_GEN_ALL_CORE
This CM met at the bedside with the pt. Introduced myself as the CM explained my role and left contact information. The pt verbalized understanding. The pt lives in a private home with her  and 2 children. 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   The PCP is Dr. Nichols in [facility] and the pharmacy is [facility] in [place]. No skilled needs anticipated for transition home. The  who is very supportive will transport her home. CM to remain available for post acute needs./No Anticipated Discharge Needs

## 2023-01-28 LAB
ALBUMIN SERPL ELPH-MCNC: 2 G/DL — LOW (ref 3.3–5)
ALP SERPL-CCNC: 116 U/L — SIGNIFICANT CHANGE UP (ref 30–120)
ALT FLD-CCNC: 50 U/L DA — SIGNIFICANT CHANGE UP (ref 10–60)
ANION GAP SERPL CALC-SCNC: 6 MMOL/L — SIGNIFICANT CHANGE UP (ref 5–17)
AST SERPL-CCNC: 46 U/L — HIGH (ref 10–40)
BASOPHILS # BLD AUTO: 0.02 K/UL — SIGNIFICANT CHANGE UP (ref 0–0.2)
BASOPHILS NFR BLD AUTO: 0.4 % — SIGNIFICANT CHANGE UP (ref 0–2)
BILIRUB SERPL-MCNC: 0.3 MG/DL — SIGNIFICANT CHANGE UP (ref 0.2–1.2)
BUN SERPL-MCNC: 24 MG/DL — HIGH (ref 7–23)
CALCIUM SERPL-MCNC: 9 MG/DL — SIGNIFICANT CHANGE UP (ref 8.4–10.5)
CHLORIDE SERPL-SCNC: 110 MMOL/L — HIGH (ref 96–108)
CO2 SERPL-SCNC: 24 MMOL/L — SIGNIFICANT CHANGE UP (ref 22–31)
CREAT SERPL-MCNC: 1.47 MG/DL — HIGH (ref 0.5–1.3)
CULTURE RESULTS: SIGNIFICANT CHANGE UP
EGFR: 41 ML/MIN/1.73M2 — LOW
EOSINOPHIL # BLD AUTO: 0.05 K/UL — SIGNIFICANT CHANGE UP (ref 0–0.5)
EOSINOPHIL NFR BLD AUTO: 1 % — SIGNIFICANT CHANGE UP (ref 0–6)
GLUCOSE SERPL-MCNC: 104 MG/DL — HIGH (ref 70–99)
HCT VFR BLD CALC: 28.2 % — LOW (ref 34.5–45)
HGB BLD-MCNC: 8.9 G/DL — LOW (ref 11.5–15.5)
IMM GRANULOCYTES NFR BLD AUTO: 1 % — HIGH (ref 0–0.9)
LYMPHOCYTES # BLD AUTO: 0.72 K/UL — LOW (ref 1–3.3)
LYMPHOCYTES # BLD AUTO: 14.5 % — SIGNIFICANT CHANGE UP (ref 13–44)
MCHC RBC-ENTMCNC: 27.4 PG — SIGNIFICANT CHANGE UP (ref 27–34)
MCHC RBC-ENTMCNC: 31.6 GM/DL — LOW (ref 32–36)
MCV RBC AUTO: 86.8 FL — SIGNIFICANT CHANGE UP (ref 80–100)
MONOCYTES # BLD AUTO: 0.49 K/UL — SIGNIFICANT CHANGE UP (ref 0–0.9)
MONOCYTES NFR BLD AUTO: 9.8 % — SIGNIFICANT CHANGE UP (ref 2–14)
NEUTROPHILS # BLD AUTO: 3.65 K/UL — SIGNIFICANT CHANGE UP (ref 1.8–7.4)
NEUTROPHILS NFR BLD AUTO: 73.3 % — SIGNIFICANT CHANGE UP (ref 43–77)
NRBC # BLD: 0 /100 WBCS — SIGNIFICANT CHANGE UP (ref 0–0)
PLATELET # BLD AUTO: 180 K/UL — SIGNIFICANT CHANGE UP (ref 150–400)
POTASSIUM SERPL-MCNC: 4.2 MMOL/L — SIGNIFICANT CHANGE UP (ref 3.5–5.3)
POTASSIUM SERPL-SCNC: 4.2 MMOL/L — SIGNIFICANT CHANGE UP (ref 3.5–5.3)
PROT SERPL-MCNC: 5.7 G/DL — LOW (ref 6–8.3)
RBC # BLD: 3.25 M/UL — LOW (ref 3.8–5.2)
RBC # FLD: 14.7 % — HIGH (ref 10.3–14.5)
SODIUM SERPL-SCNC: 140 MMOL/L — SIGNIFICANT CHANGE UP (ref 135–145)
SPECIMEN SOURCE: SIGNIFICANT CHANGE UP
WBC # BLD: 4.98 K/UL — SIGNIFICANT CHANGE UP (ref 3.8–10.5)
WBC # FLD AUTO: 4.98 K/UL — SIGNIFICANT CHANGE UP (ref 3.8–10.5)

## 2023-01-28 RX ORDER — SODIUM CHLORIDE 9 MG/ML
1000 INJECTION INTRAMUSCULAR; INTRAVENOUS; SUBCUTANEOUS
Refills: 0 | Status: DISCONTINUED | OUTPATIENT
Start: 2023-01-28 | End: 2023-01-30

## 2023-01-28 RX ADMIN — Medication 1 TABLET(S): at 17:56

## 2023-01-28 RX ADMIN — CEFTRIAXONE 100 MILLIGRAM(S): 500 INJECTION, POWDER, FOR SOLUTION INTRAMUSCULAR; INTRAVENOUS at 10:10

## 2023-01-28 RX ADMIN — Medication 650 MILLIGRAM(S): at 00:30

## 2023-01-28 RX ADMIN — SODIUM CHLORIDE 50 MILLILITER(S): 9 INJECTION INTRAMUSCULAR; INTRAVENOUS; SUBCUTANEOUS at 20:05

## 2023-01-28 RX ADMIN — Medication 650 MILLIGRAM(S): at 10:17

## 2023-01-28 RX ADMIN — ENOXAPARIN SODIUM 30 MILLIGRAM(S): 100 INJECTION SUBCUTANEOUS at 21:25

## 2023-01-28 RX ADMIN — ATORVASTATIN CALCIUM 10 MILLIGRAM(S): 80 TABLET, FILM COATED ORAL at 21:25

## 2023-01-28 RX ADMIN — SODIUM CHLORIDE 50 MILLILITER(S): 9 INJECTION INTRAMUSCULAR; INTRAVENOUS; SUBCUTANEOUS at 21:26

## 2023-01-28 RX ADMIN — Medication 650 MILLIGRAM(S): at 10:16

## 2023-01-28 RX ADMIN — Medication 1 TABLET(S): at 10:16

## 2023-01-28 RX ADMIN — Medication 650 MILLIGRAM(S): at 20:04

## 2023-01-28 RX ADMIN — Medication 650 MILLIGRAM(S): at 21:04

## 2023-01-28 RX ADMIN — Medication 650 MILLIGRAM(S): at 01:00

## 2023-01-28 RX ADMIN — SODIUM CHLORIDE 50 MILLILITER(S): 9 INJECTION INTRAMUSCULAR; INTRAVENOUS; SUBCUTANEOUS at 10:17

## 2023-01-29 LAB
ALBUMIN SERPL ELPH-MCNC: 2.1 G/DL — LOW (ref 3.3–5)
ALP SERPL-CCNC: 109 U/L — SIGNIFICANT CHANGE UP (ref 30–120)
ALT FLD-CCNC: 48 U/L DA — SIGNIFICANT CHANGE UP (ref 10–60)
ANION GAP SERPL CALC-SCNC: 7 MMOL/L — SIGNIFICANT CHANGE UP (ref 5–17)
AST SERPL-CCNC: 32 U/L — SIGNIFICANT CHANGE UP (ref 10–40)
BASOPHILS # BLD AUTO: 0.01 K/UL — SIGNIFICANT CHANGE UP (ref 0–0.2)
BASOPHILS NFR BLD AUTO: 0.2 % — SIGNIFICANT CHANGE UP (ref 0–2)
BILIRUB SERPL-MCNC: 0.4 MG/DL — SIGNIFICANT CHANGE UP (ref 0.2–1.2)
BUN SERPL-MCNC: 18 MG/DL — SIGNIFICANT CHANGE UP (ref 7–23)
CALCIUM SERPL-MCNC: 8.6 MG/DL — SIGNIFICANT CHANGE UP (ref 8.4–10.5)
CHLORIDE SERPL-SCNC: 105 MMOL/L — SIGNIFICANT CHANGE UP (ref 96–108)
CO2 SERPL-SCNC: 26 MMOL/L — SIGNIFICANT CHANGE UP (ref 22–31)
CREAT SERPL-MCNC: 1.2 MG/DL — SIGNIFICANT CHANGE UP (ref 0.5–1.3)
EGFR: 52 ML/MIN/1.73M2 — LOW
EOSINOPHIL # BLD AUTO: 0.1 K/UL — SIGNIFICANT CHANGE UP (ref 0–0.5)
EOSINOPHIL NFR BLD AUTO: 1.6 % — SIGNIFICANT CHANGE UP (ref 0–6)
GLUCOSE SERPL-MCNC: 123 MG/DL — HIGH (ref 70–99)
HCT VFR BLD CALC: 28.7 % — LOW (ref 34.5–45)
HGB BLD-MCNC: 9 G/DL — LOW (ref 11.5–15.5)
IMM GRANULOCYTES NFR BLD AUTO: 1.4 % — HIGH (ref 0–0.9)
LYMPHOCYTES # BLD AUTO: 0.85 K/UL — LOW (ref 1–3.3)
LYMPHOCYTES # BLD AUTO: 13.3 % — SIGNIFICANT CHANGE UP (ref 13–44)
MCHC RBC-ENTMCNC: 27 PG — SIGNIFICANT CHANGE UP (ref 27–34)
MCHC RBC-ENTMCNC: 31.4 GM/DL — LOW (ref 32–36)
MCV RBC AUTO: 86.2 FL — SIGNIFICANT CHANGE UP (ref 80–100)
MONOCYTES # BLD AUTO: 0.7 K/UL — SIGNIFICANT CHANGE UP (ref 0–0.9)
MONOCYTES NFR BLD AUTO: 10.9 % — SIGNIFICANT CHANGE UP (ref 2–14)
NEUTROPHILS # BLD AUTO: 4.66 K/UL — SIGNIFICANT CHANGE UP (ref 1.8–7.4)
NEUTROPHILS NFR BLD AUTO: 72.6 % — SIGNIFICANT CHANGE UP (ref 43–77)
NRBC # BLD: 0 /100 WBCS — SIGNIFICANT CHANGE UP (ref 0–0)
PLATELET # BLD AUTO: 223 K/UL — SIGNIFICANT CHANGE UP (ref 150–400)
POTASSIUM SERPL-MCNC: 3.7 MMOL/L — SIGNIFICANT CHANGE UP (ref 3.5–5.3)
POTASSIUM SERPL-SCNC: 3.7 MMOL/L — SIGNIFICANT CHANGE UP (ref 3.5–5.3)
PROT SERPL-MCNC: 5.1 G/DL — LOW (ref 6–8.3)
RBC # BLD: 3.33 M/UL — LOW (ref 3.8–5.2)
RBC # FLD: 14.7 % — HIGH (ref 10.3–14.5)
SODIUM SERPL-SCNC: 138 MMOL/L — SIGNIFICANT CHANGE UP (ref 135–145)
WBC # BLD: 6.41 K/UL — SIGNIFICANT CHANGE UP (ref 3.8–10.5)
WBC # FLD AUTO: 6.41 K/UL — SIGNIFICANT CHANGE UP (ref 3.8–10.5)

## 2023-01-29 RX ORDER — PANTOPRAZOLE SODIUM 20 MG/1
40 TABLET, DELAYED RELEASE ORAL
Refills: 0 | Status: DISCONTINUED | OUTPATIENT
Start: 2023-01-29 | End: 2023-01-30

## 2023-01-29 RX ORDER — ONDANSETRON 8 MG/1
4 TABLET, FILM COATED ORAL EVERY 6 HOURS
Refills: 0 | Status: DISCONTINUED | OUTPATIENT
Start: 2023-01-29 | End: 2023-01-30

## 2023-01-29 RX ORDER — CEFPODOXIME PROXETIL 100 MG
200 TABLET ORAL EVERY 12 HOURS
Refills: 0 | Status: DISCONTINUED | OUTPATIENT
Start: 2023-01-31 | End: 2023-01-30

## 2023-01-29 RX ADMIN — PANTOPRAZOLE SODIUM 40 MILLIGRAM(S): 20 TABLET, DELAYED RELEASE ORAL at 13:04

## 2023-01-29 RX ADMIN — Medication 1 TABLET(S): at 17:12

## 2023-01-29 RX ADMIN — Medication 650 MILLIGRAM(S): at 21:10

## 2023-01-29 RX ADMIN — Medication 1 TABLET(S): at 08:43

## 2023-01-29 RX ADMIN — ATORVASTATIN CALCIUM 10 MILLIGRAM(S): 80 TABLET, FILM COATED ORAL at 21:10

## 2023-01-29 RX ADMIN — CEFTRIAXONE 100 MILLIGRAM(S): 500 INJECTION, POWDER, FOR SOLUTION INTRAMUSCULAR; INTRAVENOUS at 09:19

## 2023-01-29 RX ADMIN — Medication 650 MILLIGRAM(S): at 13:04

## 2023-01-29 RX ADMIN — SODIUM CHLORIDE 50 MILLILITER(S): 9 INJECTION INTRAMUSCULAR; INTRAVENOUS; SUBCUTANEOUS at 17:10

## 2023-01-29 RX ADMIN — ONDANSETRON 4 MILLIGRAM(S): 8 TABLET, FILM COATED ORAL at 13:03

## 2023-01-29 RX ADMIN — ENOXAPARIN SODIUM 30 MILLIGRAM(S): 100 INJECTION SUBCUTANEOUS at 21:10

## 2023-01-29 RX ADMIN — Medication 650 MILLIGRAM(S): at 14:00

## 2023-01-29 RX ADMIN — Medication 650 MILLIGRAM(S): at 22:37

## 2023-01-30 VITALS
HEART RATE: 74 BPM | RESPIRATION RATE: 17 BRPM | TEMPERATURE: 98 F | OXYGEN SATURATION: 98 % | SYSTOLIC BLOOD PRESSURE: 130 MMHG | DIASTOLIC BLOOD PRESSURE: 76 MMHG

## 2023-01-30 LAB
ALBUMIN SERPL ELPH-MCNC: 2.1 G/DL — LOW (ref 3.3–5)
ALP SERPL-CCNC: 104 U/L — SIGNIFICANT CHANGE UP (ref 30–120)
ALT FLD-CCNC: 70 U/L DA — HIGH (ref 10–60)
ANION GAP SERPL CALC-SCNC: 6 MMOL/L — SIGNIFICANT CHANGE UP (ref 5–17)
AST SERPL-CCNC: 71 U/L — HIGH (ref 10–40)
BILIRUB SERPL-MCNC: 0.4 MG/DL — SIGNIFICANT CHANGE UP (ref 0.2–1.2)
BUN SERPL-MCNC: 13 MG/DL — SIGNIFICANT CHANGE UP (ref 7–23)
CALCIUM SERPL-MCNC: 8.8 MG/DL — SIGNIFICANT CHANGE UP (ref 8.4–10.5)
CHLORIDE SERPL-SCNC: 103 MMOL/L — SIGNIFICANT CHANGE UP (ref 96–108)
CO2 SERPL-SCNC: 28 MMOL/L — SIGNIFICANT CHANGE UP (ref 22–31)
CREAT SERPL-MCNC: 1.1 MG/DL — SIGNIFICANT CHANGE UP (ref 0.5–1.3)
EGFR: 58 ML/MIN/1.73M2 — LOW
GLUCOSE SERPL-MCNC: 99 MG/DL — SIGNIFICANT CHANGE UP (ref 70–99)
HCT VFR BLD CALC: 28.7 % — LOW (ref 34.5–45)
HGB BLD-MCNC: 9.3 G/DL — LOW (ref 11.5–15.5)
MCHC RBC-ENTMCNC: 27.5 PG — SIGNIFICANT CHANGE UP (ref 27–34)
MCHC RBC-ENTMCNC: 32.4 GM/DL — SIGNIFICANT CHANGE UP (ref 32–36)
MCV RBC AUTO: 84.9 FL — SIGNIFICANT CHANGE UP (ref 80–100)
NRBC # BLD: 0 /100 WBCS — SIGNIFICANT CHANGE UP (ref 0–0)
PLATELET # BLD AUTO: 249 K/UL — SIGNIFICANT CHANGE UP (ref 150–400)
POTASSIUM SERPL-MCNC: 3.5 MMOL/L — SIGNIFICANT CHANGE UP (ref 3.5–5.3)
POTASSIUM SERPL-SCNC: 3.5 MMOL/L — SIGNIFICANT CHANGE UP (ref 3.5–5.3)
PROT SERPL-MCNC: 5.9 G/DL — LOW (ref 6–8.3)
RBC # BLD: 3.38 M/UL — LOW (ref 3.8–5.2)
RBC # FLD: 14.6 % — HIGH (ref 10.3–14.5)
SODIUM SERPL-SCNC: 137 MMOL/L — SIGNIFICANT CHANGE UP (ref 135–145)
WBC # BLD: 6.64 K/UL — SIGNIFICANT CHANGE UP (ref 3.8–10.5)
WBC # FLD AUTO: 6.64 K/UL — SIGNIFICANT CHANGE UP (ref 3.8–10.5)

## 2023-01-30 PROCEDURE — 85730 THROMBOPLASTIN TIME PARTIAL: CPT

## 2023-01-30 PROCEDURE — 36415 COLL VENOUS BLD VENIPUNCTURE: CPT

## 2023-01-30 PROCEDURE — 85610 PROTHROMBIN TIME: CPT

## 2023-01-30 PROCEDURE — 81001 URINALYSIS AUTO W/SCOPE: CPT

## 2023-01-30 PROCEDURE — 83036 HEMOGLOBIN GLYCOSYLATED A1C: CPT

## 2023-01-30 PROCEDURE — 80053 COMPREHEN METABOLIC PANEL: CPT

## 2023-01-30 PROCEDURE — 74176 CT ABD & PELVIS W/O CONTRAST: CPT | Mod: MA

## 2023-01-30 PROCEDURE — 80061 LIPID PANEL: CPT

## 2023-01-30 PROCEDURE — 87040 BLOOD CULTURE FOR BACTERIA: CPT

## 2023-01-30 PROCEDURE — 85027 COMPLETE CBC AUTOMATED: CPT

## 2023-01-30 PROCEDURE — 87637 SARSCOV2&INF A&B&RSV AMP PRB: CPT

## 2023-01-30 PROCEDURE — 87086 URINE CULTURE/COLONY COUNT: CPT

## 2023-01-30 PROCEDURE — 83690 ASSAY OF LIPASE: CPT

## 2023-01-30 PROCEDURE — 83605 ASSAY OF LACTIC ACID: CPT

## 2023-01-30 PROCEDURE — 71045 X-RAY EXAM CHEST 1 VIEW: CPT

## 2023-01-30 PROCEDURE — 96365 THER/PROPH/DIAG IV INF INIT: CPT

## 2023-01-30 PROCEDURE — 85025 COMPLETE CBC W/AUTO DIFF WBC: CPT

## 2023-01-30 PROCEDURE — 93005 ELECTROCARDIOGRAM TRACING: CPT

## 2023-01-30 PROCEDURE — 96375 TX/PRO/DX INJ NEW DRUG ADDON: CPT

## 2023-01-30 PROCEDURE — 96361 HYDRATE IV INFUSION ADD-ON: CPT

## 2023-01-30 PROCEDURE — 86803 HEPATITIS C AB TEST: CPT

## 2023-01-30 PROCEDURE — 99285 EMERGENCY DEPT VISIT HI MDM: CPT | Mod: 25

## 2023-01-30 RX ORDER — CEFPODOXIME PROXETIL 100 MG
1 TABLET ORAL
Qty: 14 | Refills: 0
Start: 2023-01-30 | End: 2023-02-05

## 2023-01-30 RX ORDER — ONDANSETRON 8 MG/1
1 TABLET, FILM COATED ORAL
Qty: 28 | Refills: 0
Start: 2023-01-30 | End: 2023-02-05

## 2023-01-30 RX ORDER — PANTOPRAZOLE SODIUM 20 MG/1
1 TABLET, DELAYED RELEASE ORAL
Qty: 30 | Refills: 0
Start: 2023-01-30 | End: 2023-02-28

## 2023-01-30 RX ORDER — LACTOBACILLUS ACIDOPHILUS 100MM CELL
2 CAPSULE ORAL
Qty: 20 | Refills: 0
Start: 2023-01-30 | End: 2023-02-08

## 2023-01-30 RX ADMIN — PANTOPRAZOLE SODIUM 40 MILLIGRAM(S): 20 TABLET, DELAYED RELEASE ORAL at 05:57

## 2023-01-30 RX ADMIN — Medication 1 TABLET(S): at 08:48

## 2023-01-30 RX ADMIN — CEFTRIAXONE 100 MILLIGRAM(S): 500 INJECTION, POWDER, FOR SOLUTION INTRAMUSCULAR; INTRAVENOUS at 10:04

## 2023-01-30 RX ADMIN — SODIUM CHLORIDE 50 MILLILITER(S): 9 INJECTION INTRAMUSCULAR; INTRAVENOUS; SUBCUTANEOUS at 08:48

## 2023-01-30 NOTE — DISCHARGE NOTE PROVIDER - HOSPITAL COURSE
57-year-old female past medical history of hypertension hyperlipidemia asthma C-sections x2 coming in for fever and UTI symptoms for over 1 week.  Patient states she went to urgent care diagnosed with UTI started on Bactrim developed abdominal pain vomiting so went back yesterday thought to be antibiotic reaction started on Macrobid but continues to have vomiting.  Patient also having low-grade temperatures flu COVID-negative urinary symptoms have improved.  Patient denies any chest pain shortness of breath cough.   In ED febrile, tachycardic, neutrophilia, elvated BUN creatinine, and CT - rt renal edema pyelonephritis.started on ceftriaxone and admitted for  sepsis ( fever tachycardia, neutrophilia,raj)  due to ac pyelonephritis  ess HTN  HLD  RAJ- poss sec Bactrim resolved  Anemia - likely multifactorial , and with infection- out pt f up with pcp and w up after tt for infection  h/o asthma   started on ceftriaxone,   Cont Rocephin, increase to 2grams IV daily Fu cultures  ID f up noted    DC home on vantin for 7 days

## 2023-01-30 NOTE — PROGRESS NOTE ADULT - PROBLEM SELECTOR PLAN 1
rocephine, iv fluids , ID consult noted, f up cultures
rocephine, iv fluids , ID consult noted, f up cultures- neg blood and urine pt was on abx when cultures taken
rocephine, iv fluids , ID consult noted, f up cultures
rocephine, iv fluids , ID consult, f up cultures

## 2023-01-30 NOTE — DISCHARGE NOTE NURSING/CASE MANAGEMENT/SOCIAL WORK - PATIENT PORTAL LINK FT
You can access the FollowMyHealth Patient Portal offered by [facility] by registering at the following website: [email or web address]. By joining [employer]’s FollowMyHealth portal, you will also be able to view your health information using other applications (apps) compatible with our system.

## 2023-01-30 NOTE — DISCHARGE NOTE PROVIDER - NSDCCPCAREPLAN_GEN_ALL_CORE_FT
PRINCIPAL DISCHARGE DIAGNOSIS  Diagnosis: Pyelonephritis  Assessment and Plan of Treatment: vantin for 7 days, out pt f up with pcp      SECONDARY DISCHARGE DIAGNOSES  Diagnosis: RAJ (acute kidney injury)  Assessment and Plan of Treatment:

## 2023-01-30 NOTE — PROGRESS NOTE ADULT - PROBLEM SELECTOR PROBLEM 2
RAJ (acute kidney injury)

## 2023-01-30 NOTE — PROGRESS NOTE ADULT - REASON FOR ADMISSION
nausea, vomiting , abd pain

## 2023-01-30 NOTE — CASE MANAGEMENT PROGRESS NOTE - NSCMPROGRESSNOTE_GEN_ALL_CORE
Per treatment team pt. for likely transition to home today with no skilled needs for home care.  CM met with pt. at bedside.  Introduced self and role.  Pt. states she has no needs for home care and family to transport. Pt. will get d/c instruction from primary nurse when cleared.  CM available.

## 2023-01-30 NOTE — PROGRESS NOTE ADULT - ASSESSMENT
The patient is a 57 year old female with a history of HTN, HL, asthma who presents with fevers, nausea, vomiting in the setting of pyelonephritis.    Plan:  - ECG with sinus tachycardia; no evidence of ischemia or infarction  - Initial tachycardia likely due to underlying infection  - Remain off of amlodipine unless SBP trends up above 140  - Continue atorvastatin 10 mg daily  - RAJ resolved  - On oral antibiotics
57-year-old female past medical history of hypertension hyperlipidemia asthma C-sections x2 coming in for fever and UTI symptoms for over 1 week.  Patient states she went to urgent care diagnosed with UTI started on Bactrim developed abdominal pain vomiting so went back yesterday thought to be antibiotic reaction started on Macrobid but continues to have vomiting.  Patient also having low-grade temperatures flu COVID-negative urinary symptoms have improved.  Patient denies any chest pain shortness of breath cough.   In ED febrile, tachycardic, neutrophilia, elvated BUN creatinine, and CT - rt renal edema [email or web address].started on ceftriaxone and admitted for  sepsis ( fever tachycardia, neutrophilia,raj)  due to ac pyelonephritis  ess HTN  HLD  RAJ- poss sec Bactrim resolved  Anemia - likely multifactorial , and with infection- out pt f up with pcp and w up after tt for infection  h/o asthma   started on ceftriaxone,   Cont Rocephin, increase to 2grams IV daily Fu cultures  ID f up noted    DC home on vantin for 7 days    
The patient is a 57 year old female with a history of HTN, HL, asthma who presents with fevers, nausea, vomiting in the setting of pyelonephritis.    Plan:  - ECG with sinus tachycardia; no evidence of ischemia or infarction  - Initial tachycardia likely due to underlying infection  - BP on lower side - systolics 90-100s  - Hold amlodipine  - As BP improves, will resume amlodipine  - Continue atorvastatin 10 mg daily  - IV antibiotics  - Continue IV fluids
The patient is a 57 year old female with a history of HTN, HL, asthma who presents with fevers, nausea, vomiting in the setting of pyelonephritis.    Plan:  - ECG with sinus tachycardia; no evidence of ischemia or infarction  - Initial tachycardia likely due to underlying infection  - BP on lower side - systolics 90-100s  - Remain off of amlodipine  - Continue atorvastatin 10 mg daily  - RAJ resolved  - IV antibiotics
57-year-old female past medical history of hypertension hyperlipidemia asthma C-sections x2 coming in for fever and UTI symptoms for over 1 week.  Patient states she went to urgent care diagnosed with UTI started on Bactrim developed abdominal pain vomiting so went back yesterday thought to be antibiotic reaction started on Macrobid but continues to have vomiting.  Patient also having low-grade temperatures flu COVID-negative urinary symptoms have improved.  Patient denies any chest pain shortness of breath cough.   In ED febrile, tachycardic, neutrophilia, elvated BUN creatinine, and CT - rt renal edema [email or web address].started on ceftriaxone and admitted for  sepsis ( fever tachycardia, neutrophilia,raj)  due to ac pyelonephritis  ess HTN  HLD  RAJ   started on ceftriaxone, ID consult and nephro consult called    
57-year-old female past medical history of hypertension hyperlipidemia asthma C-sections x2 coming in for fever and UTI symptoms for over 1 week.  Patient states she went to urgent care diagnosed with UTI started on Bactrim developed abdominal pain vomiting so went back yesterday thought to be antibiotic reaction started on Macrobid but continues to have vomiting.  Patient also having low-grade temperatures flu COVID-negative urinary symptoms have improved.  Patient denies any chest pain shortness of breath cough.   In ED febrile, tachycardic, neutrophilia, elvated BUN creatinine, and CT - rt renal edema [email or web address].started on ceftriaxone and admitted for  sepsis ( fever tachycardia, neutrophilia,raj)  due to ac pyelonephritis  ess HTN  HLD  RAJ- poss sec Bactrim   started on ceftriaxone,   Cont Rocephin, increase to 2grams IV daily Fu cultures  ID f up    
57-year-old female past medical history of hypertension hyperlipidemia asthma C-sections x2 coming in for fever and UTI symptoms for over 1 week.  Patient states she went to urgent care diagnosed with UTI started on Bactrim developed abdominal pain vomiting so went back yesterday thought to be antibiotic reaction started on Macrobid but continues to have vomiting.  Patient also having low-grade temperatures flu COVID-negative urinary symptoms have improved.  Patient denies any chest pain shortness of breath cough.   In ED febrile, tachycardic, neutrophilia, elvated BUN creatinine, and CT - rt renal edema [email or web address].started on ceftriaxone and admitted for  sepsis ( fever tachycardia, neutrophilia,raj)  due to ac pyelonephritis  ess HTN  HLD  RAJ- poss sec Bactrim   started on ceftriaxone,   Cont Rocephin, increase to 2grams IV daily Fu cultures

## 2023-01-30 NOTE — PROGRESS NOTE ADULT - SUBJECTIVE AND OBJECTIVE BOX
SATISH COLE is a 57yFemale , patient examined and chart reviewed.     INTERVAL HPI/ OVERNIGHT EVENTS:   Feeling better. Afebrile.  Doing well.    PAST MEDICAL & SURGICAL HISTORY:      For details regarding the patient's social history, family history, and other miscellaneous elements, please refer the initial infectious diseases consultation and/or the admitting history and physical examination for this admission.      ROS:  CONSTITUTIONAL:  Negative fever or chills  EYES:  Negative  blurry vision or double vision  CARDIOVASCULAR:  Negative for chest pain or palpitations  RESPIRATORY:  Negative for cough, wheezing, or SOB   GASTROINTESTINAL:  Negative for nausea, vomiting, diarrhea, constipation, or abdominal pain  GENITOURINARY:  Negative frequency, urgency or dysuria  NEUROLOGIC:  No headache, confusion, dizziness, lightheadedness  All other systems were reviewed and are negative         Current inpatient medications :    ANTIBIOTICS/RELEVANT:  cefTRIAXone   IVPB 2000 milliGRAM(s) IV Intermittent every 24 hours  lactobacillus acidophilus 1 Tablet(s) Oral two times a day with meals      MEDICATIONS  (STANDING):  atorvastatin 10 milliGRAM(s) Oral at bedtime  enoxaparin Injectable 30 milliGRAM(s) SubCutaneous every 24 hours  lactobacillus acidophilus 1 Tablet(s) Oral two times a day with meals  ondansetron Injectable 4 milliGRAM(s) IV Push once  pantoprazole    Tablet 40 milliGRAM(s) Oral before breakfast  sodium chloride 0.9%. 1000 milliLiter(s) (50 mL/Hr) IV Continuous <Continuous>    MEDICATIONS  (PRN):  acetaminophen     Tablet .. 650 milliGRAM(s) Oral every 6 hours PRN Temp greater or equal to 38C (100.4F), Moderate Pain (4 - 6)  albuterol    90 MICROgram(s) HFA Inhaler 1 Puff(s) Inhalation every 6 hours PRN Bronchospasm  ondansetron    Tablet 4 milliGRAM(s) Oral every 6 hours PRN Nausea and/or Vomiting      Objective:  Vital Signs Last 24 Hrs  T(C): 36.9 (29 Jan 2023 20:36), Max: 37.4 (29 Jan 2023 05:42)  T(F): 98.5 (29 Jan 2023 20:36), Max: 99.4 (29 Jan 2023 05:42)  HR: 82 (29 Jan 2023 20:36) (76 - 88)  BP: 130/75 (29 Jan 2023 20:36) (100/52 - 130/75)  RR: 17 (29 Jan 2023 20:36) (16 - 18)  SpO2: 95% (29 Jan 2023 20:36) (94% - 96%)    Parameters below as of 29 Jan 2023 20:36  Patient On (Oxygen Delivery Method): room air      Physical Exam:  General: no acute distress  Neck: supple, trachea midline  Lungs: clear, no wheeze/rhonchi  Cardiovascular: regular rate and rhythm, S1 S2  Abdomen: soft, nontender,  bowel sounds normal  Neurological: alert and oriented x3  Skin: no rash  Extremities: no edema      LABS:                        9.0    6.41  )-----------( 223      ( 29 Jan 2023 06:44 )             28.7   01-29    138  |  105  |  18  ----------------------------<  123<H>  3.7   |  26  |  1.20    Ca    8.6      29 Jan 2023 06:44    TPro  5.1<L>  /  Alb  2.1<L>  /  TBili  0.4  /  DBili  x   /  AST  32  /  ALT  48  /  AlkPhos  109  01-29      MICROBIOLOGY:    Culture - Urine (collected 26 Jan 2023 18:05)  Source: Clean Catch Clean Catch (Midstream)  Final Report (28 Jan 2023 07:58):    <10,000 CFU/mL Normal Urogenital Jane    Culture - Blood (collected 26 Jan 2023 18:05)  Source: .Blood Blood-Peripheral  Preliminary Report (28 Jan 2023 01:03):    No growth to date.    Culture - Blood (collected 26 Jan 2023 18:05)  Source: .Blood Blood-Peripheral  Preliminary Report (28 Jan 2023 01:03):    No growth to date.    RADIOLOGY & ADDITIONAL STUDIES:      ACC: [id number] EXAM:  CT ABDOMEN AND PELVIS   ORDERED BY: TAMIA HERRERA     PROCEDURE DATE:  01/26/2023          INTERPRETATION:  CLINICAL INFORMATION: 57-year-old female patient with   fever and abdominal pain, vomiting    COMPARISON: CT abdomen pelvis 2/20/2010.    CONTRAST/COMPLICATIONS:  IV Contrast: NONE  Oral Contrast: NONE  Complications: None reported at time of study completion    PROCEDURE:  CT of the Abdomen and Pelvis was performed.  Sagittal and coronal reformats were performed.    FINDINGS:  LOWER CHEST: Within normal limits.    LIVER: Within normal limits.  BILE DUCTS: Normal caliber.  GALLBLADDER: Decompressed gallbladder with sludge.  SPLEEN: Within normal limits of size.  PANCREAS: Within normal limits.  ADRENALS: Within normal limits.  KIDNEYS/URETERS: Asymmetric enlargement of the right kidney with edema,   extensive right perinephric stranding and periureteral stranding   surrounding the proximal ureter, however without saleem hydronephrosis or   hydroureter. No nephroureterolithiasis. No calcified renal or ureteral   calculi. Left kidney and left collecting system appear normal.    BLADDER: Within normal limits.Several phleboliths in the pelvis unrelated   to the urinary system.  REPRODUCTIVE ORGANS: Uterus and adnexa within normal limits.    BOWEL: Small hiatal hernia. Stomach, duodenum and small bowel appear   grossly normal. There is no bowel obstruction. Appendix is normal.  PERITONEUM: Trace free pelvic fluid. No loculated collections. Extensive   edema surrounding the right kidney is detailed above.  VESSELS: Within normal limits.  RETROPERITONEUM/LYMPH NODES: No lymphadenopathy.  ABDOMINAL WALL: Within normal limits.  BONES: Within normal limits.    IMPRESSION:  Asymmetric edema and swelling of the right kidney and surrounding tissues   compared to left without saleem hydroureteronephrosis or obstructing   calcified urolithiasis. Findings are concerning for urinary tract   infection with right pyelonephritis not excluded. If there is clinical   concern, this could be better evaluated with CT of the abdomen and pelvis   with iv contrast.    Assessment :   58YO F PMH hypertension hyperlipidemia asthma C-sections x 2admitted with sepsis sec Acute right pyelonephritis.   In ED febrile Tmax 100.8, tachycardic, RAJ and CT - rt renal edema pyelonephritis. UA with pyuria.   RAJ poss sec Bactrim- improving  No rash  Ucx ngtd - was on antibiotics prior to admission    Plan :   Cont Rocephin 2grams IV daily day 3  Will change to po Vantin x 7 days  Trend temps and cbc, renal fx  Pulm toileting  Increase activity  Stable from ID standpoint  Dc home tmrw      Continue with present regiment.  Appropriate use of antibiotics and adverse effects reviewed.      I have discussed the above plan of care with patient in detail. She expressed understanding of the  treatment plan . Risks, benefits and alternatives discussed in detail. I have asked if she has any questions or concerns and appropriately addressed them to the best of my ability .    > 35 minutes were spent in direct patient care reviewing notes, medications ,labs data/ imaging , discussion with multidisciplinary team.    Thank you for allowing me to participate in care of your patient .    Elen Banda MD  Infectious Disease  [phone]
     SATISH COLE is a 57yFemale , patient examined and chart reviewed.     INTERVAL HPI/ OVERNIGHT EVENTS:   NAD. Afebrile.  Doing well.    PAST MEDICAL & SURGICAL HISTORY:      For details regarding the patient's social history, family history, and other miscellaneous elements, please refer the initial infectious diseases consultation and/or the admitting history and physical examination for this admission.      ROS:  CONSTITUTIONAL:  Negative fever or chills  EYES:  Negative  blurry vision or double vision  CARDIOVASCULAR:  Negative for chest pain or palpitations  RESPIRATORY:  Negative for cough, wheezing, or SOB   GASTROINTESTINAL:  Negative for nausea, vomiting, diarrhea, constipation, or abdominal pain  GENITOURINARY:  Negative frequency, urgency or dysuria  NEUROLOGIC:  No headache, confusion, dizziness, lightheadedness  All other systems were reviewed and are negative         Current inpatient medications :    ANTIBIOTICS/RELEVANT:  cefTRIAXone   IVPB 2000 milliGRAM(s) IV Intermittent every 24 hours  lactobacillus acidophilus 1 Tablet(s) Oral two times a day with meals    MEDICATIONS  (STANDING):  atorvastatin 10 milliGRAM(s) Oral at bedtime  enoxaparin Injectable 30 milliGRAM(s) SubCutaneous every 24 hours  lactobacillus acidophilus 1 Tablet(s) Oral two times a day with meals  ondansetron Injectable 4 milliGRAM(s) IV Push once  pantoprazole    Tablet 40 milliGRAM(s) Oral before breakfast  sodium chloride 0.9%. 1000 milliLiter(s) (50 mL/Hr) IV Continuous <Continuous>    MEDICATIONS  (PRN):  acetaminophen     Tablet .. 650 milliGRAM(s) Oral every 6 hours PRN Temp greater or equal to 38C (100.4F), Moderate Pain (4 - 6)  albuterol    90 MICROgram(s) HFA Inhaler 1 Puff(s) Inhalation every 6 hours PRN Bronchospasm  ondansetron    Tablet 4 milliGRAM(s) Oral every 6 hours PRN Nausea and/or Vomiting      Objective:  Vital Signs Last 24 Hrs  T(C): 37.3 (30 Jan 2023 06:01), Max: 37.3 (30 Jan 2023 06:01)  T(F): 99.1 (30 Jan 2023 06:01), Max: 99.1 (30 Jan 2023 06:01)  HR: 80 (30 Jan 2023 06:01) (80 - 82)  BP: 120/66 (30 Jan 2023 06:01) (120/66 - 130/75)  RR: 18 (30 Jan 2023 06:01) (17 - 18)  SpO2: 94% (30 Jan 2023 06:01) (94% - 95%)    Parameters below as of 30 Jan 2023 06:01  Patient On (Oxygen Delivery Method): room air      Physical Exam:  General: no acute distress  Neck: supple, trachea midline  Lungs: clear, no wheeze/rhonchi  Cardiovascular: regular rate and rhythm, S1 S2  Abdomen: soft, nontender,  bowel sounds normal  Neurological: alert and oriented x3  Skin: no rash  Extremities: no edema      LABS:                        9.3    6.64  )-----------( 249      ( 30 Jan 2023 06:50 )             28.7   01-30    137  |  103  |  13  ----------------------------<  99  3.5   |  28  |  1.10    Ca    8.8      30 Jan 2023 06:50    TPro  5.9<L>  /  Alb  2.1<L>  /  TBili  0.4  /  DBili  x   /  AST  71<H>  /  ALT  70<H>  /  AlkPhos  104  01-30    MICROBIOLOGY:  Culture - Urine (collected 26 Jan 2023 18:05)  Source: Clean Catch Clean Catch (Midstream)  Final Report (28 Jan 2023 07:58):    <10,000 CFU/mL Normal Urogenital Jane    Culture - Blood (collected 26 Jan 2023 18:05)  Source: .Blood Blood-Peripheral  Preliminary Report (28 Jan 2023 01:03):    No growth to date.    Culture - Blood (collected 26 Jan 2023 18:05)  Source: .Blood Blood-Peripheral  Preliminary Report (28 Jan 2023 01:03):    No growth to date.    RADIOLOGY & ADDITIONAL STUDIES:    ACC: [id number] EXAM:  CT ABDOMEN AND PELVIS   ORDERED BY: TAMIA HERRERA     PROCEDURE DATE:  01/26/2023          INTERPRETATION:  CLINICAL INFORMATION: 57-year-old female patient with   fever and abdominal pain, vomiting    COMPARISON: CT abdomen pelvis 2/20/2010.    CONTRAST/COMPLICATIONS:  IV Contrast: NONE  Oral Contrast: NONE  Complications: None reported at time of study completion    PROCEDURE:  CT of the Abdomen and Pelvis was performed.  Sagittal and coronal reformats were performed.    FINDINGS:  LOWER CHEST: Within normal limits.    LIVER: Within normal limits.  BILE DUCTS: Normal caliber.  GALLBLADDER: Decompressed gallbladder with sludge.  SPLEEN: Within normal limits of size.  PANCREAS: Within normal limits.  ADRENALS: Within normal limits.  KIDNEYS/URETERS: Asymmetric enlargement of the right kidney with edema,   extensive right perinephric stranding and periureteral stranding   surrounding the proximal ureter, however without saleem hydronephrosis or   hydroureter. No nephroureterolithiasis. No calcified renal or ureteral   calculi. Left kidney and left collecting system appear normal.    BLADDER: Within normal limits.Several phleboliths in the pelvis unrelated   to the urinary system.  REPRODUCTIVE ORGANS: Uterus and adnexa within normal limits.    BOWEL: Small hiatal hernia. Stomach, duodenum and small bowel appear   grossly normal. There is no bowel obstruction. Appendix is normal.  PERITONEUM: Trace free pelvic fluid. No loculated collections. Extensive   edema surrounding the right kidney is detailed above.  VESSELS: Within normal limits.  RETROPERITONEUM/LYMPH NODES: No lymphadenopathy.  ABDOMINAL WALL: Within normal limits.  BONES: Within normal limits.    IMPRESSION:  Asymmetric edema and swelling of the right kidney and surrounding tissues   compared to left without saleem hydroureteronephrosis or obstructing   calcified urolithiasis. Findings are concerning for urinary tract   infection with right pyelonephritis not excluded. If there is clinical   concern, this could be better evaluated with CT of the abdomen and pelvis   with iv contrast.    Assessment :   58YO F PMH hypertension hyperlipidemia asthma C-sections x 2admitted with sepsis sec Acute right pyelonephritis.   In ED febrile Tmax 100.8, tachycardic, RAJ and CT - rt renal edema pyelonephritis. UA with pyuria.   RAJ poss sec Bactrim- improving  No rash  Ucx ngtd - was on antibiotics prior to admission  Overall clinically better    Plan :   On Rocephin 2grams IV daily day 4  Can dc on po Vantin x 6 days  Trend temps and cbc, renal fx  Pulm toileting  Increase activity  Stable from ID standpoint  Dc home    D/w Dr Harley      Continue with present regiment.  Appropriate use of antibiotics and adverse effects reviewed.      I have discussed the above plan of care with patient in detail. She expressed understanding of the  treatment plan . Risks, benefits and alternatives discussed in detail. I have asked if she has any questions or concerns and appropriately addressed them to the best of my ability .    > 35 minutes were spent in direct patient care reviewing notes, medications ,labs data/ imaging , discussion with multidisciplinary team.    Thank you for allowing me to participate in care of your patient .    Elen Banda MD  Infectious Disease  [phone]
Chief Complaint: Nausea    Interval Events: No events overnight.    Review of Systems:  General: No fevers, chills, weight gain  Skin: No rashes, color changes  Cardiovascular: No chest pain, orthopnea  Respiratory: No shortness of breath, cough  Gastrointestinal: No nausea, abdominal pain  Genitourinary: No incontinence, pain with urination  Musculoskeletal: No pain, swelling, decreased range of motion  Neurological: No headache, weakness  Psychiatric: No depression, anxiety  Endocrine: No weight gain, increased thirst  All other systems are comprehensively negative.    Physical Exam:  Vitals:        Vital Signs Last 24 Hrs  T(C): 36.8 (28 Jan 2023 05:10), Max: 37.3 (27 Jan 2023 17:25)  T(F): 98.3 (28 Jan 2023 05:10), Max: 99.2 (27 Jan 2023 17:25)  HR: 73 (28 Jan 2023 05:10) (73 - 94)  BP: 99/57 (28 Jan 2023 05:10) (99/57 - 122/72)  BP(mean): --  RR: 18 (28 Jan 2023 05:10) (17 - 20)  SpO2: 93% (28 Jan 2023 05:10) (92% - 98%)  Parameters below as of 27 Jan 2023 20:55  Patient On (Oxygen Delivery Method): room air  General: NAD  HEENT: MMM  Neck: No JVD, no carotid bruit  Lungs: CTAB  CV: RRR, nl S1/S2, no M/R/G  Abdomen: S/NT/ND, +BS  Extremities: No LE edema, no cyanosis  Neuro: AAOx3, non-focal  Skin: No rash    Labs:                        8.9    4.98  )-----------( 180      ( 28 Jan 2023 06:00 )             28.2     01-28    140  |  110<H>  |  24<H>  ----------------------------<  104<H>  4.2   |  24  |  1.47<H>    Ca    9.0      28 Jan 2023 06:00    TPro  5.7<L>  /  Alb  2.0<L>  /  TBili  0.3  /  DBili  x   /  AST  46<H>  /  ALT  50  /  AlkPhos  116  01-28        PT/INR - ( 26 Jan 2023 18:04 )   PT: 12.8 sec;   INR: 1.08 ratio         PTT - ( 26 Jan 2023 18:04 )  PTT:26.4 sec  
Patient is a 57y old  Female who presents with a chief complaint of nausea, vomiting , abd pain (2023 20:58)      INTERVAL HPI/OVERNIGHT EVENTS:overnight events noted    Home Medications:  albuterol 90 mcg/inh inhalation aerosol with adapter:  (2023 20:04)  amLODIPine 5 mg oral tablet: 1 tab(s) orally once a day (2023 20:04)  atorvastatin 10 mg oral tablet: 1 tab(s) orally once a day (2023 20:04)      MEDICATIONS  (STANDING):  amLODIPine   Tablet 5 milliGRAM(s) Oral daily  atorvastatin 10 milliGRAM(s) Oral at bedtime  cefTRIAXone   IVPB 1000 milliGRAM(s) IV Intermittent every 24 hours  enoxaparin Injectable 30 milliGRAM(s) SubCutaneous every 24 hours  lactobacillus acidophilus 1 Tablet(s) Oral two times a day with meals  ondansetron Injectable 4 milliGRAM(s) IV Push once  sodium chloride 0.9%. 1000 milliLiter(s) (100 mL/Hr) IV Continuous <Continuous>    MEDICATIONS  (PRN):  acetaminophen     Tablet .. 650 milliGRAM(s) Oral every 6 hours PRN Temp greater or equal to 38C (100.4F), Moderate Pain (4 - 6)  albuterol    90 MICROgram(s) HFA Inhaler 1 Puff(s) Inhalation every 6 hours PRN Bronchospasm      Allergies    No Known Allergies    Intolerances        REVIEW OF SYSTEMS:  CONSTITUTIONAL: has fever, and  fatigue  EYES: No eye pain, visual disturbances, or discharge  ENMT:  No difficulty hearing, tinnitus, vertigo; No sinus or throat pain  NECK: No pain or stiffness  BREASTS: No pain, masses, or nipple discharge  RESPIRATORY: No cough, wheezing, chills or hemoptysis; No shortness of breath  CARDIOVASCULAR: No chest pain, palpitations, dizziness, or leg swelling  GASTROINTESTINAL: has  abdominal and nausea, vomiting,  GENITOURINARY: has dysuria, frequency,  NEUROLOGICAL: No headaches, memory loss, loss of strength, numbness, or tremors  SKIN: No itching, burning, rashes, or lesions       Vital Signs Last 24 Hrs  T(C): 37 (2023 05:20), Max: 38.2 (2023 17:27)  T(F): 98.6 (2023 05:20), Max: 100.8 (2023 17:27)  HR: 80 (2023 05:20) (80 - 124)  BP: 96/61 (2023 05:20) (96/61 - 131/69)  BP(mean): --  RR: 16 (2023 05:20) (14 - 16)  SpO2: 97% (2023 05:20) (97% - 100%)    Parameters below as of 2023 05:20  Patient On (Oxygen Delivery Method): room air        PHYSICAL EXAM:  GENERAL: well-groomed, well-developed  HEAD:  Atraumatic, Normocephalic  EYES: EOMI, PERRLA, conjunctiva and sclera clear  ENMT: Moist mucous membranes,   NECK: Supple, No JVD, Normal thyroid  NERVOUS SYSTEM:  Alert & Oriented X3, Good concentration; Motor Strength 5/5 B/L upper and lower extremities; DTRs 2+ intact and symmetric  CHEST/LUNG: Clear to percussion bilaterally; No rales, rhonchi, wheezing, or rubs  HEART: Regular rate and rhythm; No murmurs, rubs, or gallops  ABDOMEN: Soft, Nontender, Nondistended; Bowel sounds present  EXTREMITIES:  2+ Peripheral Pulses, No clubbing, cyanosis, or edema      LABS:                        9.3    6.04  )-----------( 128      ( 2023 08:11 )             29.8     -    139  |  106  |  34<H>  ----------------------------<  96  4.4   |  24  |  2.02<H>    Ca    8.8      2023 08:11    TPro  5.4<L>  /  Alb  2.3<L>  /  TBili  0.4  /  DBili  x   /  AST  27  /  ALT  26  /  AlkPhos  106  -    PT/INR - ( 2023 18:04 )   PT: 12.8 sec;   INR: 1.08 ratio         PTT - ( 2023 18:04 )  PTT:26.4 sec  Urinalysis Basic - ( 2023 18:05 )    Color: Yellow / Appearance: Clear / S.010 / pH: x  Gluc: x / Ketone: Moderate  / Bili: Negative / Urobili: Negative mg/dL   Blood: x / Protein: 100 mg/dL / Nitrite: Negative   Leuk Esterase: Moderate / RBC: 6-10 /HPF / WBC 26-50   Sq Epi: x / Non Sq Epi: Few / Bacteria: Few      CAPILLARY BLOOD GLUCOSE              I&O's Summary    2023 07:01  -  2023 07:00  --------------------------------------------------------  IN: 400 mL / OUT: 0 mL / NET: 400 mL        RADIOLOGY & ADDITIONAL TESTS:    Imaging Personally Reviewed:  [ x] YES  [ ] NO    Consultant(s) Notes Reviewed:  [x ] YES  [ ] NO    Care Discussed with Consultants/Other Providers [ x] YES  [ ] NO
Patient is a 57y old  Female who presents with a chief complaint of nausea, vomiting , abd pain (28 Jan 2023 14:30)      INTERVAL HPI/OVERNIGHT EVENTS:    Home Medications:  albuterol 90 mcg/inh inhalation aerosol with adapter:  (26 Jan 2023 20:04)  amLODIPine 5 mg oral tablet: 1 tab(s) orally once a day (26 Jan 2023 20:04)  atorvastatin 10 mg oral tablet: 1 tab(s) orally once a day (26 Jan 2023 20:04)      MEDICATIONS  (STANDING):  atorvastatin 10 milliGRAM(s) Oral at bedtime  cefTRIAXone   IVPB 2000 milliGRAM(s) IV Intermittent every 24 hours  enoxaparin Injectable 30 milliGRAM(s) SubCutaneous every 24 hours  lactobacillus acidophilus 1 Tablet(s) Oral two times a day with meals  ondansetron Injectable 4 milliGRAM(s) IV Push once  sodium chloride 0.9%. 1000 milliLiter(s) (50 mL/Hr) IV Continuous <Continuous>    MEDICATIONS  (PRN):  acetaminophen     Tablet .. 650 milliGRAM(s) Oral every 6 hours PRN Temp greater or equal to 38C (100.4F), Moderate Pain (4 - 6)  albuterol    90 MICROgram(s) HFA Inhaler 1 Puff(s) Inhalation every 6 hours PRN Bronchospasm      Allergies    No Known Allergies    Intolerances        REVIEW OF SYSTEMS:  CONSTITUTIONAL: No fever, weight loss, or fatigue  EYES: No eye pain, visual disturbances, or discharge  ENMT:  No difficulty hearing, tinnitus, vertigo; No sinus or throat pain  NECK: No pain or stiffness  BREASTS: No pain, masses, or nipple discharge  RESPIRATORY: No cough, wheezing, chills or hemoptysis; No shortness of breath  CARDIOVASCULAR: No chest pain, palpitations, dizziness, or leg swelling  GASTROINTESTINAL: No abdominal or epigastric pain. No nausea, vomiting, or hematemesis; No diarrhea or constipation. No melena or hematochezia.  GENITOURINARY: No dysuria, frequency, hematuria, or incontinence  NEUROLOGICAL: No headaches, memory loss, loss of strength, numbness, or tremors  SKIN: No itching, burning, rashes, or lesions   LYMPH NODES: No enlarged glands  ENDOCRINE: No heat or cold intolerance; No hair loss  MUSCULOSKELETAL: No joint pain or swelling; No muscle, back, or extremity pain  PSYCHIATRIC: No depression, anxiety, mood swings, or difficulty sleeping  HEME/LYMPH: No easy bruising, or bleeding gums  ALLERGY AND IMMUNOLOGIC: No hives or eczema    Vital Signs Last 24 Hrs  T(C): 37.4 (29 Jan 2023 05:42), Max: 37.5 (28 Jan 2023 20:25)  T(F): 99.4 (29 Jan 2023 05:42), Max: 99.5 (28 Jan 2023 20:25)  HR: 88 (29 Jan 2023 05:42) (74 - 88)  BP: 100/52 (29 Jan 2023 05:42) (100/52 - 113/63)  BP(mean): --  RR: 16 (29 Jan 2023 05:42) (16 - 16)  SpO2: 94% (29 Jan 2023 05:42) (93% - 94%)    Parameters below as of 28 Jan 2023 20:25  Patient On (Oxygen Delivery Method): room air        PHYSICAL EXAM:  GENERAL: NAD, well-groomed, well-developed  HEAD:  Atraumatic, Normocephalic  EYES: EOMI, PERRLA, conjunctiva and sclera clear  ENMT: Moist mucous membranes,   NECK: Supple, No JVD, Normal thyroid  NERVOUS SYSTEM:  Alert & Oriented X3, Good concentration; Motor Strength 5/5 B/L upper and lower extremities; DTRs 2+ intact and symmetric  CHEST/LUNG: Clear to percussion bilaterally; No rales, rhonchi, wheezing, or rubs  HEART: Regular rate and rhythm; No murmurs, rubs, or gallops  ABDOMEN: Soft, Nontender, Nondistended; Bowel sounds present  EXTREMITIES:  2+ Peripheral Pulses, No clubbing, cyanosis, or edema  LYMPH: No lymphadenopathy noted  SKIN: No rashes or lesions    LABS:                        9.0    6.41  )-----------( 223      ( 29 Jan 2023 06:44 )             28.7     01-29    138  |  105  |  18  ----------------------------<  123<H>  3.7   |  26  |  1.20    Ca    8.6      29 Jan 2023 06:44    TPro  5.1<L>  /  Alb  2.1<L>  /  TBili  0.4  /  DBili  x   /  AST  32  /  ALT  48  /  AlkPhos  109  01-29        CAPILLARY BLOOD GLUCOSE            Culture - Urine (collected 01-26-23 @ 18:05)  Source: Clean Catch Clean Catch (Midstream)  Final Report (01-28-23 @ 07:58):    <10,000 CFU/mL Normal Urogenital Jane    Culture - Blood (collected 01-26-23 @ 18:05)  Source: .Blood Blood-Peripheral  Preliminary Report (01-28-23 @ 01:03):    No growth to date.    Culture - Blood (collected 01-26-23 @ 18:05)  Source: .Blood Blood-Peripheral  Preliminary Report (01-28-23 @ 01:03):    No growth to date.        I&O's Summary      RADIOLOGY & ADDITIONAL TESTS:    Imaging Personally Reviewed:  [ x] YES  [ ] NO    Consultant(s) Notes Reviewed:  [x ] YES  [ ] NO    Care Discussed with Consultants/Other Providers [ x] YES  [ ] NO
     SATISH COLE is a 57yFemale , patient examined and chart reviewed.     INTERVAL HPI/ OVERNIGHT EVENTS:   Feeling better. Afebrile.    PAST MEDICAL & SURGICAL HISTORY:      For details regarding the patient's social history, family history, and other miscellaneous elements, please refer the initial infectious diseases consultation and/or the admitting history and physical examination for this admission.      ROS:  CONSTITUTIONAL:  Negative fever or chills  EYES:  Negative  blurry vision or double vision  CARDIOVASCULAR:  Negative for chest pain or palpitations  RESPIRATORY:  Negative for cough, wheezing, or SOB   GASTROINTESTINAL:  Negative for nausea, vomiting, diarrhea, constipation, or abdominal pain  GENITOURINARY:  Negative frequency, urgency or dysuria  NEUROLOGIC:  No headache, confusion, dizziness, lightheadedness  All other systems were reviewed and are negative         Current inpatient medications :    ANTIBIOTICS/RELEVANT:  cefTRIAXone   IVPB 2000 milliGRAM(s) IV Intermittent every 24 hours  lactobacillus acidophilus 1 Tablet(s) Oral two times a day with meals      acetaminophen     Tablet .. 650 milliGRAM(s) Oral every 6 hours PRN  albuterol    90 MICROgram(s) HFA Inhaler 1 Puff(s) Inhalation every 6 hours PRN  atorvastatin 10 milliGRAM(s) Oral at bedtime  enoxaparin Injectable 30 milliGRAM(s) SubCutaneous every 24 hours  ondansetron Injectable 4 milliGRAM(s) IV Push once  sodium chloride 0.9%. 1000 milliLiter(s) IV Continuous <Continuous>      Objective:    T(C): 37.5 (01-28-23 @ 20:25), Max: 37.5 (01-28-23 @ 20:25)  HR: 88 (01-28-23 @ 20:25) (73 - 88)  BP: 113/63 (01-28-23 @ 20:25) (99/57 - 113/63)  RR: 16 (01-28-23 @ 20:25) (16 - 18)  SpO2: 93% (01-28-23 @ 20:25) (93% - 93%)      Physical Exam:  General: no acute distress  Neck: supple, trachea midline  Lungs: clear, no wheeze/rhonchi  Cardiovascular: regular rate and rhythm, S1 S2  Abdomen: soft, nontender,  bowel sounds normal  Neurological: alert and oriented x3  Skin: no rash  Extremities: no edema      LABS:                          8.9    4.98  )-----------( 180      ( 28 Jan 2023 06:00 )             28.2       01-28    140  |  110<H>  |  24<H>  ----------------------------<  104<H>  4.2   |  24  |  1.47<H>    Ca    9.0      28 Jan 2023 06:00    TPro  5.7<L>  /  Alb  2.0<L>  /  TBili  0.3  /  DBili  x   /  AST  46<H>  /  ALT  50  /  AlkPhos  116  01-28        MICROBIOLOGY:    Culture - Urine (collected 26 Jan 2023 18:05)  Source: Clean Catch Clean Catch (Midstream)  Final Report (28 Jan 2023 07:58):    <10,000 CFU/mL Normal Urogenital Jane    Culture - Blood (collected 26 Jan 2023 18:05)  Source: .Blood Blood-Peripheral  Preliminary Report (28 Jan 2023 01:03):    No growth to date.    Culture - Blood (collected 26 Jan 2023 18:05)  Source: .Blood Blood-Peripheral  Preliminary Report (28 Jan 2023 01:03):    No growth to date.    RADIOLOGY & ADDITIONAL STUDIES:      ACC: [id number] EXAM:  CT ABDOMEN AND PELVIS   ORDERED BY: TAMIA HERRERA     PROCEDURE DATE:  01/26/2023          INTERPRETATION:  CLINICAL INFORMATION: 57-year-old female patient with   fever and abdominal pain, vomiting    COMPARISON: CT abdomen pelvis 2/20/2010.    CONTRAST/COMPLICATIONS:  IV Contrast: NONE  Oral Contrast: NONE  Complications: None reported at time of study completion    PROCEDURE:  CT of the Abdomen and Pelvis was performed.  Sagittal and coronal reformats were performed.    FINDINGS:  LOWER CHEST: Within normal limits.    LIVER: Within normal limits.  BILE DUCTS: Normal caliber.  GALLBLADDER: Decompressed gallbladder with sludge.  SPLEEN: Within normal limits of size.  PANCREAS: Within normal limits.  ADRENALS: Within normal limits.  KIDNEYS/URETERS: Asymmetric enlargement of the right kidney with edema,   extensive right perinephric stranding and periureteral stranding   surrounding the proximal ureter, however without saleem hydronephrosis or   hydroureter. No nephroureterolithiasis. No calcified renal or ureteral   calculi. Left kidney and left collecting system appear normal.    BLADDER: Within normal limits.Several phleboliths in the pelvis unrelated   to the urinary system.  REPRODUCTIVE ORGANS: Uterus and adnexa within normal limits.    BOWEL: Small hiatal hernia. Stomach, duodenum and small bowel appear   grossly normal. There is no bowel obstruction. Appendix is normal.  PERITONEUM: Trace free pelvic fluid. No loculated collections. Extensive   edema surrounding the right kidney is detailed above.  VESSELS: Within normal limits.  RETROPERITONEUM/LYMPH NODES: No lymphadenopathy.  ABDOMINAL WALL: Within normal limits.  BONES: Within normal limits.    IMPRESSION:  Asymmetric edema and swelling of the right kidney and surrounding tissues   compared to left without saleem hydroureteronephrosis or obstructing   calcified urolithiasis. Findings are concerning for urinary tract   infection with right pyelonephritis not excluded. If there is clinical   concern, this could be better evaluated with CT of the abdomen and pelvis   with iv contrast.    Assessment :   56YO F PMH hypertension hyperlipidemia asthma C-sections x 2admitted with sepsis sec Acute right pyelonephritis.   In ED febrile Tmax 100.8, tachycardic, RAJ and CT - rt renal edema pyelonephritis. UA with pyuria.   RAJ poss sec Bactrim- improving  No rash  Ucx ngtd - was on antibiotics prior to admission    Plan :   Cont Rocephin 2grams IV daily   Trend temps and cbc, renal fx  Pulm toileting  Increase activity      Continue with present regiment.  Appropriate use of antibiotics and adverse effects reviewed.      I have discussed the above plan of care with patient in detail. She expressed understanding of the  treatment plan . Risks, benefits and alternatives discussed in detail. I have asked if she has any questions or concerns and appropriately addressed them to the best of my ability .    > 35 minutes were spent in direct patient care reviewing notes, medications ,labs data/ imaging , discussion with multidisciplinary team.    Thank you for allowing me to participate in care of your patient .    Elen Banda MD  Infectious Disease  [phone]
Chief Complaint: Nausea    Interval Events: No events overnight.    Review of Systems:  General: No fevers, chills, weight gain  Skin: No rashes, color changes  Cardiovascular: No chest pain, orthopnea  Respiratory: No shortness of breath, cough  Gastrointestinal: No nausea, abdominal pain  Genitourinary: No incontinence, pain with urination  Musculoskeletal: No pain, swelling, decreased range of motion  Neurological: No headache, weakness  Psychiatric: No depression, anxiety  Endocrine: No weight gain, increased thirst  All other systems are comprehensively negative.    Physical Exam:  Vital Signs Last 24 Hrs  T(C): 37.4 (29 Jan 2023 05:42), Max: 37.5 (28 Jan 2023 20:25)  T(F): 99.4 (29 Jan 2023 05:42), Max: 99.5 (28 Jan 2023 20:25)  HR: 88 (29 Jan 2023 05:42) (74 - 88)  BP: 100/52 (29 Jan 2023 05:42) (100/52 - 113/63)  BP(mean): --  RR: 16 (29 Jan 2023 05:42) (16 - 16)  SpO2: 94% (29 Jan 2023 05:42) (93% - 94%)  Parameters below as of 28 Jan 2023 20:25  Patient On (Oxygen Delivery Method): room air  General: NAD  HEENT: MMM  Neck: No JVD, no carotid bruit  Lungs: CTAB  CV: RRR, nl S1/S2, no M/R/G  Abdomen: S/NT/ND, +BS  Extremities: No LE edema, no cyanosis  Neuro: AAOx3, non-focal  Skin: No rash    Labs:             01-29    138  |  105  |  18  ----------------------------<  123<H>  3.7   |  26  |  1.20    Ca    8.6      29 Jan 2023 06:44    TPro  5.1<L>  /  Alb  2.1<L>  /  TBili  0.4  /  DBili  x   /  AST  32  /  ALT  48  /  AlkPhos  109  01-29                        9.0    6.41  )-----------( 223      ( 29 Jan 2023 06:44 )             28.7   
Chief Complaint: Nausea    Interval Events: No events overnight.    Review of Systems:  General: No fevers, chills, weight gain  Skin: No rashes, color changes  Cardiovascular: No chest pain, orthopnea  Respiratory: No shortness of breath, cough  Gastrointestinal: No nausea, abdominal pain  Genitourinary: No incontinence, pain with urination  Musculoskeletal: No pain, swelling, decreased range of motion  Neurological: No headache, weakness  Psychiatric: No depression, anxiety  Endocrine: No weight gain, increased thirst  All other systems are comprehensively negative.    Physical Exam:  Vital Signs Last 24 Hrs  T(C): 37.3 (30 Jan 2023 06:01), Max: 37.3 (29 Jan 2023 11:39)  T(F): 99.1 (30 Jan 2023 06:01), Max: 99.1 (29 Jan 2023 11:39)  HR: 80 (30 Jan 2023 06:01) (76 - 82)  BP: 120/66 (30 Jan 2023 06:01) (107/67 - 130/75)  BP(mean): --  RR: 18 (30 Jan 2023 06:01) (17 - 18)  SpO2: 94% (30 Jan 2023 06:01) (94% - 96%)  Parameters below as of 30 Jan 2023 06:01  Patient On (Oxygen Delivery Method): room air  General: NAD  HEENT: MMM  Neck: No JVD, no carotid bruit  Lungs: CTAB  CV: RRR, nl S1/S2, no M/R/G  Abdomen: S/NT/ND, +BS  Extremities: No LE edema, no cyanosis  Neuro: AAOx3, non-focal  Skin: No rash    Labs:             01-30    137  |  103  |  13  ----------------------------<  99  3.5   |  28  |  1.10    Ca    8.8      30 Jan 2023 06:50    TPro  5.9<L>  /  Alb  2.1<L>  /  TBili  0.4  /  DBili  x   /  AST  71<H>  /  ALT  70<H>  /  AlkPhos  104  01-30                        9.3    6.64  )-----------( 249      ( 30 Jan 2023 06:50 )             28.7   
Subjective: feels better. Min flank pain      MEDICATIONS  (STANDING):  atorvastatin 10 milliGRAM(s) Oral at bedtime  cefTRIAXone   IVPB 2000 milliGRAM(s) IV Intermittent every 24 hours  enoxaparin Injectable 30 milliGRAM(s) SubCutaneous every 24 hours  lactobacillus acidophilus 1 Tablet(s) Oral two times a day with meals  ondansetron Injectable 4 milliGRAM(s) IV Push once  sodium chloride 0.9%. 1000 milliLiter(s) (100 mL/Hr) IV Continuous <Continuous>    MEDICATIONS  (PRN):  acetaminophen     Tablet .. 650 milliGRAM(s) Oral every 6 hours PRN Temp greater or equal to 38C (100.4F), Moderate Pain (4 - 6)  albuterol    90 MICROgram(s) HFA Inhaler 1 Puff(s) Inhalation every 6 hours PRN Bronchospasm          T(C): 36.8 (01-28-23 @ 05:10), Max: 37.3 (01-27-23 @ 17:25)  HR: 73 (01-28-23 @ 05:10) (73 - 94)  BP: 99/57 (01-28-23 @ 05:10) (99/57 - 122/72)  RR: 18 (01-28-23 @ 05:10) (17 - 20)  SpO2: 93% (01-28-23 @ 05:10) (92% - 98%)  Wt(kg): --        I&O's Detail           PHYSICAL EXAM:    GENERAL: NAD  NECK: Supple, no inc in JVP  CHEST/LUNG: Clear  HEART: S1S2  ABDOMEN: Soft, Nontender, Nondistended; Bowel sounds present  EXTREMITIES:  no edema.   NEURO: no asterixis      LABS:  CBC Full  -  ( 28 Jan 2023 06:00 )  WBC Count : 4.98 K/uL  RBC Count : 3.25 M/uL  Hemoglobin : 8.9 g/dL  Hematocrit : 28.2 %  Platelet Count - Automated : 180 K/uL  Mean Cell Volume : 86.8 fl  Mean Cell Hemoglobin : 27.4 pg  Mean Cell Hemoglobin Concentration : 31.6 gm/dL  Auto Neutrophil # : 3.65 K/uL  Auto Lymphocyte # : 0.72 K/uL  Auto Monocyte # : 0.49 K/uL  Auto Eosinophil # : 0.05 K/uL  Auto Basophil # : 0.02 K/uL  Auto Neutrophil % : 73.3 %  Auto Lymphocyte % : 14.5 %  Auto Monocyte % : 9.8 %  Auto Eosinophil % : 1.0 %  Auto Basophil % : 0.4 %    01-28    140  |  110<H>  |  24<H>  ----------------------------<  104<H>  4.2   |  24  |  1.47<H>    Ca    9.0      28 Jan 2023 06:00    TPro  5.7<L>  /  Alb  2.0<L>  /  TBili  0.3  /  DBili  x   /  AST  46<H>  /  ALT  50  /  AlkPhos  116  01-28    PT/INR - ( 26 Jan 2023 18:04 )   PT: 12.8 sec;   INR: 1.08 ratio             Impression:  * RAJ -- pre-renal, Bactrim effect on distal Cr secretion. Improving  * UTI, pyelonephritis.     Recommendations:   * Dec Saline to 50cc/h  * BMP in 48h    Thank you!        
Patient is a 57y old  Female who presents with a chief complaint of nausea, vomiting , abd pain (29 Jan 2023 18:41)      INTERVAL HPI/OVERNIGHT EVENTS:overnight events noted    Home Medications:  albuterol 90 mcg/inh inhalation aerosol with adapter:  (26 Jan 2023 20:04)  amLODIPine 5 mg oral tablet: 1 tab(s) orally once a day (26 Jan 2023 20:04)  atorvastatin 10 mg oral tablet: 1 tab(s) orally once a day (26 Jan 2023 20:04)      MEDICATIONS  (STANDING):  atorvastatin 10 milliGRAM(s) Oral at bedtime  enoxaparin Injectable 30 milliGRAM(s) SubCutaneous every 24 hours  lactobacillus acidophilus 1 Tablet(s) Oral two times a day with meals  ondansetron Injectable 4 milliGRAM(s) IV Push once  pantoprazole    Tablet 40 milliGRAM(s) Oral before breakfast  sodium chloride 0.9%. 1000 milliLiter(s) (50 mL/Hr) IV Continuous <Continuous>    MEDICATIONS  (PRN):  acetaminophen     Tablet .. 650 milliGRAM(s) Oral every 6 hours PRN Temp greater or equal to 38C (100.4F), Moderate Pain (4 - 6)  albuterol    90 MICROgram(s) HFA Inhaler 1 Puff(s) Inhalation every 6 hours PRN Bronchospasm  ondansetron    Tablet 4 milliGRAM(s) Oral every 6 hours PRN Nausea and/or Vomiting      Allergies    No Known Allergies    Intolerances        REVIEW OF SYSTEMS:  CONSTITUTIONAL: No fever, weight loss, or fatigue  EYES: No eye pain, visual disturbances, or discharge  ENMT:  No difficulty hearing, tinnitus, vertigo; No sinus or throat pain  NECK: No pain or stiffness  BREASTS: No pain, masses, or nipple discharge  RESPIRATORY: No cough, wheezing, chills or hemoptysis; No shortness of breath  CARDIOVASCULAR: No chest pain, palpitations, dizziness, or leg swelling  GASTROINTESTINAL: No abdominal or epigastric pain. No nausea, vomiting, or hematemesis; No diarrhea or constipation. No melena or hematochezia.  GENITOURINARY: No dysuria, frequency, hematuria, or incontinence  NEUROLOGICAL: No headaches, memory loss, loss of strength, numbness, or tremors  Vital Signs Last 24 Hrs  T(C): 37.3 (30 Jan 2023 06:01), Max: 37.3 (29 Jan 2023 11:39)  T(F): 99.1 (30 Jan 2023 06:01), Max: 99.1 (29 Jan 2023 11:39)  HR: 80 (30 Jan 2023 06:01) (76 - 82)  BP: 120/66 (30 Jan 2023 06:01) (107/67 - 130/75)  BP(mean): --  RR: 18 (30 Jan 2023 06:01) (17 - 18)  SpO2: 94% (30 Jan 2023 06:01) (94% - 96%)    Parameters below as of 30 Jan 2023 06:01  Patient On (Oxygen Delivery Method): room air        PHYSICAL EXAM:  GENERAL: well-groomed, well-developed  HEAD:  Atraumatic, Normocephalic  EYES: EOMI, PERRLA, conjunctiva and sclera clear  ENMT: Moist mucous membranes,   NECK: Supple, No JVD, Normal thyroid  NERVOUS SYSTEM:  Alert & Oriented X3, non focal  CHEST/LUNG: Clear to percussion bilaterally; No rales, rhonchi, wheezing, or rubs  HEART: Regular rate and rhythm; No murmurs, rubs, or gallops  ABDOMEN: Soft, Nontender, Nondistended; Bowel sounds present  EXTREMITIES:  2+ Peripheral Pulses, No clubbing, cyanosis, or edema  LYMPH: No lymphadenopathy noted  SKIN: No rashes or lesions    LABS:                        9.3    6.64  )-----------( 249      ( 30 Jan 2023 06:50 )             28.7     01-30    137  |  103  |  13  ----------------------------<  99  3.5   |  28  |  1.10    Ca    8.8      30 Jan 2023 06:50    TPro  5.9<L>  /  Alb  2.1<L>  /  TBili  0.4  /  DBili  x   /  AST  71<H>  /  ALT  70<H>  /  AlkPhos  104  01-30        CAPILLARY BLOOD GLUCOSE            Culture - Urine (collected 01-26-23 @ 18:05)  Source: Clean Catch Clean Catch (Midstream)  Final Report (01-28-23 @ 07:58):    <10,000 CFU/mL Normal Urogenital Jane    Culture - Blood (collected 01-26-23 @ 18:05)  Source: .Blood Blood-Peripheral  Preliminary Report (01-28-23 @ 01:03):    No growth to date.    Culture - Blood (collected 01-26-23 @ 18:05)  Source: .Blood Blood-Peripheral  Preliminary Report (01-28-23 @ 01:03):    No growth to date.        I&O's Summary    29 Jan 2023 07:01  -  30 Jan 2023 07:00  --------------------------------------------------------  IN: 575 mL / OUT: 0 mL / NET: 575 mL        RADIOLOGY & ADDITIONAL TESTS:    Imaging Personally Reviewed:  [ x] YES  [ ] NO    Consultant(s) Notes Reviewed:  [x ] YES  [ ] NO    Care Discussed with Consultants/Other Providers [x ] YES  [ ] NO
Patient is a 57y old  Female who presents with a chief complaint of nausea, vomiting , abd pain (2023 09:30)      INTERVAL HPI/OVERNIGHT EVENTS:overnight events noted    Home Medications:  albuterol 90 mcg/inh inhalation aerosol with adapter:  (2023 20:04)  amLODIPine 5 mg oral tablet: 1 tab(s) orally once a day (2023 20:04)  atorvastatin 10 mg oral tablet: 1 tab(s) orally once a day (2023 20:04)      MEDICATIONS  (STANDING):  atorvastatin 10 milliGRAM(s) Oral at bedtime  cefTRIAXone   IVPB 2000 milliGRAM(s) IV Intermittent every 24 hours  enoxaparin Injectable 30 milliGRAM(s) SubCutaneous every 24 hours  lactobacillus acidophilus 1 Tablet(s) Oral two times a day with meals  ondansetron Injectable 4 milliGRAM(s) IV Push once  sodium chloride 0.9%. 1000 milliLiter(s) (50 mL/Hr) IV Continuous <Continuous>    MEDICATIONS  (PRN):  acetaminophen     Tablet .. 650 milliGRAM(s) Oral every 6 hours PRN Temp greater or equal to 38C (100.4F), Moderate Pain (4 - 6)  albuterol    90 MICROgram(s) HFA Inhaler 1 Puff(s) Inhalation every 6 hours PRN Bronchospasm      Allergies    No Known Allergies    Intolerances        REVIEW OF SYSTEMS:  CONSTITUTIONAL: No fever, weight loss, or fatigue  EYES: No eye pain, visual disturbances, or discharge  ENMT:  No difficulty hearing, tinnitus, vertigo; No sinus or throat pain  NECK: No pain or stiffness  BREASTS: No pain, masses, or nipple discharge  RESPIRATORY: No cough, wheezing, chills or hemoptysis; No shortness of breath  CARDIOVASCULAR: No chest pain, palpitations, dizziness, or leg swelling  GASTROINTESTINAL: No abdominal or epigastric pain. No nausea, vomiting, or hematemesis; No diarrhea or constipation. No melena or hematochezia.  GENITOURINARY: No dysuria, frequency, hematuria, or incontinence  NEUROLOGICAL: No headaches, memory loss, loss of strength, numbness, or tremors  SKIN: No itching, burning, rashes, or lesions   LYMPH NODES: No enlarged glands  ENDOCRINE: No heat or cold intolerance; No hair loss  MUSCULOSKELETAL: No joint pain or swelling; No muscle, back, or extremity pain  PSYCHIATRIC: No depression, anxiety, mood swings, or difficulty sleeping  HEME/LYMPH: No easy bruising, or bleeding gums  ALLERGY AND IMMUNOLOGIC: No hives or eczema    Vital Signs Last 24 Hrs  T(C): 37 (2023 12:29), Max: 37.3 (2023 17:25)  T(F): 98.6 (2023 12:29), Max: 99.2 (2023 17:25)  HR: 74 (:) (73 - 94)  BP: 104/61 (:) (99/57 - 122/72)  BP(mean): --  RR: 16 (:) (16 - 20)  SpO2: 93% (:) (93% - 98%)    Parameters below as of 2023 12:  Patient On (Oxygen Delivery Method): room air        PHYSICAL EXAM:  GENERAL: NAD, well-groomed, well-developed  HEAD:  Atraumatic, Normocephalic  EYES: EOMI, PERRLA, conjunctiva and sclera clear  ENMT: Moist mucous membranes,   NECK: Supple, No JVD, Normal thyroid  NERVOUS SYSTEM:  Alert & Oriented X3, Good concentration; Motor Strength 5/5 B/L upper and lower extremities; DTRs 2+ intact and symmetric  CHEST/LUNG: Clear to percussion bilaterally; No rales, rhonchi, wheezing, or rubs  HEART: Regular rate and rhythm; No murmurs, rubs, or gallops  ABDOMEN: Soft, Nontender, Nondistended; Bowel sounds present  EXTREMITIES:  2+ Peripheral Pulses, No clubbing, cyanosis, or edema  LYMPH: No lymphadenopathy noted  SKIN: No rashes or lesions    LABS:                        8.9    4.98  )-----------( 180      ( 2023 06:00 )             28.2     -    140  |  110<H>  |  24<H>  ----------------------------<  104<H>  4.2   |  24  |  1.47<H>    Ca    9.0      2023 06:00    TPro  5.7<L>  /  Alb  2.0<L>  /  TBili  0.3  /  DBili  x   /  AST  46<H>  /  ALT  50  /  AlkPhos  116      PT/INR - ( 2023 18:04 )   PT: 12.8 sec;   INR: 1.08 ratio         PTT - ( 2023 18:04 )  PTT:26.4 sec  Urinalysis Basic - ( 2023 18:05 )    Color: Yellow / Appearance: Clear / S.010 / pH: x  Gluc: x / Ketone: Moderate  / Bili: Negative / Urobili: Negative mg/dL   Blood: x / Protein: 100 mg/dL / Nitrite: Negative   Leuk Esterase: Moderate / RBC: 6-10 /HPF / WBC 26-50   Sq Epi: x / Non Sq Epi: Few / Bacteria: Few      CAPILLARY BLOOD GLUCOSE            Culture - Urine (collected 23 @ 18:05)  Source: Clean Catch Clean Catch (Midstream)  Final Report (23 @ 07:58):    <10,000 CFU/mL Normal Urogenital Jane    Culture - Blood (collected 23 @ 18:05)  Source: .Blood Blood-Peripheral  Preliminary Report (23 @ 01:03):    No growth to date.    Culture - Blood (collected 23 @ 18:05)  Source: .Blood Blood-Peripheral  Preliminary Report (23 @ 01:03):    No growth to date.        I&O's Summary      RADIOLOGY & ADDITIONAL TESTS:    Imaging Personally Reviewed:  [ x] YES  [ ] NO    Consultant(s) Notes Reviewed:  [x ] YES  [ ] NO    Care Discussed with Consultants/Other Providers x[ ] YES  [ ] NO

## 2023-01-30 NOTE — DISCHARGE NOTE PROVIDER - NSDCMRMEDTOKEN_GEN_ALL_CORE_FT
albuterol 90 mcg/inh inhalation aerosol with adapter:   amLODIPine 5 mg oral tablet: 1 tab(s) orally once a day  atorvastatin 10 mg oral tablet: 1 tab(s) orally once a day  cefpodoxime 200 mg oral tablet: 1 tab(s) orally every 12 hours  lactobacillus acidophilus oral capsule: 2 tab(s) orally once a day   ondansetron 4 mg oral tablet: 1 tab(s) orally every 6 hours, As needed, Nausea and/or Vomiting  pantoprazole 40 mg oral delayed release tablet: 1 tab(s) orally once a day (before a meal)

## 2023-02-01 LAB
CULTURE RESULTS: SIGNIFICANT CHANGE UP
CULTURE RESULTS: SIGNIFICANT CHANGE UP
SPECIMEN SOURCE: SIGNIFICANT CHANGE UP
SPECIMEN SOURCE: SIGNIFICANT CHANGE UP

## 2023-06-20 NOTE — ED ADULT NURSE NOTE - WILL THE PATIENT ACCEPT THE PFIZER COVID-19 VACCINE IF ELIGIBLE AND IT IS AVAILABLE?
Thank you for letting us take care of you today. We hope all your questions were addressed. If a question was overlooked or something else comes to mind after you return home, please contact a member of your Care Team listed below. Your Care Team at [place] [address] is Team #1  Kayleen Guevara, Faculty Advisor  Lorri Vera, Resident Physician  Esmer Edmonds, Resident Physician  Demetrice Reeves, Resident Physician  Rony Rodriguez, [address], [address] [facility], [address] [facility], [facility] [facility]  Olga Murillo, RMA  Elio Carter, [address] [facility]) [place],   Jennifer Faria, [facility] (Clinical Pharmacist)     Office phone number: [phone]    If you need to get in right away due to illness, please be advised we have \"Same Day\" appointments available Monday-Friday. Please call us at [phone] option #3 to schedule your \"Same Day\" appointment. No

## 2025-06-13 NOTE — CARE COORDINATION ASSESSMENT. - PRO ARRIVE FROM
Chronic and inadequately controlled  - Pravastatin has been discontinued due to side effects.  - She will start taking lovastatin 10 mg as previously prescribed.  Orders:    ESTABLISHED, MOD MDM, 30-39 MIN [[id number]]    Lipid Panel; Future    
home
0 = swallows foods/liquids without difficulty
no